# Patient Record
Sex: FEMALE | Race: WHITE | Employment: FULL TIME | URBAN - METROPOLITAN AREA
[De-identification: names, ages, dates, MRNs, and addresses within clinical notes are randomized per-mention and may not be internally consistent; named-entity substitution may affect disease eponyms.]

---

## 2017-04-14 ENCOUNTER — OFFICE VISIT (OUTPATIENT)
Dept: FAMILY MEDICINE CLINIC | Facility: CLINIC | Age: 27
End: 2017-04-14

## 2017-04-14 VITALS
BODY MASS INDEX: 44.73 KG/M2 | HEART RATE: 90 BPM | HEIGHT: 64 IN | OXYGEN SATURATION: 98 % | DIASTOLIC BLOOD PRESSURE: 70 MMHG | SYSTOLIC BLOOD PRESSURE: 110 MMHG | TEMPERATURE: 98 F | WEIGHT: 262 LBS

## 2017-04-14 DIAGNOSIS — I10 ESSENTIAL HYPERTENSION: Primary | Chronic | ICD-10-CM

## 2017-04-14 DIAGNOSIS — F41.9 ANXIETY: Chronic | ICD-10-CM

## 2017-04-14 DIAGNOSIS — J03.90 TONSILLITIS: ICD-10-CM

## 2017-04-14 DIAGNOSIS — R63.5 WEIGHT GAIN: ICD-10-CM

## 2017-04-14 DIAGNOSIS — K21.9 GASTROESOPHAGEAL REFLUX DISEASE WITHOUT ESOPHAGITIS: ICD-10-CM

## 2017-04-14 PROCEDURE — 99214 OFFICE O/P EST MOD 30 MIN: CPT | Performed by: FAMILY MEDICINE

## 2017-04-14 RX ORDER — ESCITALOPRAM OXALATE 20 MG/1
20 TABLET ORAL 2 TIMES DAILY
Qty: 180 TABLET | Refills: 0 | Status: SHIPPED | OUTPATIENT
Start: 2017-04-14 | End: 2017-07-31

## 2017-04-14 RX ORDER — BUPROPION HYDROCHLORIDE 150 MG/1
150 TABLET ORAL DAILY
Qty: 90 TABLET | Refills: 0 | Status: SHIPPED | OUTPATIENT
Start: 2017-04-14 | End: 2017-08-11 | Stop reason: ALTCHOICE

## 2017-04-14 RX ORDER — TRIAMTERENE AND HYDROCHLOROTHIAZIDE 37.5; 25 MG/1; MG/1
1 CAPSULE ORAL EVERY MORNING
Qty: 90 CAPSULE | Refills: 0 | Status: SHIPPED | OUTPATIENT
Start: 2017-04-14 | End: 2017-07-31

## 2017-04-14 RX ORDER — ESCITALOPRAM OXALATE 20 MG/1
20 TABLET ORAL DAILY
Qty: 90 TABLET | Refills: 0 | Status: SHIPPED | OUTPATIENT
Start: 2017-04-14 | End: 2017-04-14

## 2017-04-14 RX ORDER — PHENTERMINE HYDROCHLORIDE 37.5 MG/1
37.5 TABLET ORAL
Qty: 90 TABLET | Refills: 0 | Status: SHIPPED | OUTPATIENT
Start: 2017-04-14 | End: 2017-08-11 | Stop reason: ALTCHOICE

## 2017-04-14 RX ORDER — LANSOPRAZOLE 30 MG/1
30 CAPSULE, DELAYED RELEASE ORAL
Qty: 90 CAPSULE | Refills: 0 | Status: SHIPPED | OUTPATIENT
Start: 2017-04-14 | End: 2017-08-11 | Stop reason: ALTCHOICE

## 2017-04-14 RX ORDER — AZITHROMYCIN 250 MG/1
TABLET, FILM COATED ORAL
Qty: 6 TABLET | Refills: 0 | Status: SHIPPED | OUTPATIENT
Start: 2017-04-14 | End: 2017-08-11 | Stop reason: ALTCHOICE

## 2017-04-17 NOTE — PROGRESS NOTES
HPI:   Bhupinder Willoughby is a 28year old female. Patient presents with:  Sore Throat  Headache  Neck Pain  Hypertension  Anxiety  Weight Problem      She primarily presents for:  Anxiety and depression. Mood is better.   She complains of:  depression, 37.5-25 MG Oral Cap Take 1 capsule by mouth every morning. Disp: 90 capsule Rfl: 0   BuPROPion HCl ER, XL, (WELLBUTRIN XL) 150 MG Oral Tablet 24 Hr Take 1 tablet (150 mg total) by mouth daily.  Disp: 90 tablet Rfl: 0   azithromycin 250 MG Oral Tab Take two x 3, CN II-XII intact B, motor and sensory grossly intact B UE and LE, nl gait  MS: no joint deformity, FROM B UE/LE  PSYCH:  mood and affect WNL, speech and thought congruent, no SHIP    ASSESSMENT AND PLAN:   (I10) Essential hypertension  (primary encoun · Follow up: as above    Meds & Refills for this Visit:    Signed Prescriptions Disp Refills    lansoprazole 30 MG Oral Capsule Delayed Release 90 capsule 0      Sig: Take 1 capsule (30 mg total) by mouth every morning before breakfast.      Phentermine

## 2017-08-01 RX ORDER — TRIAMTERENE AND HYDROCHLOROTHIAZIDE 37.5; 25 MG/1; MG/1
CAPSULE ORAL
Qty: 90 CAPSULE | Refills: 0 | Status: SHIPPED | OUTPATIENT
Start: 2017-08-01 | End: 2017-11-04

## 2017-08-01 RX ORDER — ESCITALOPRAM OXALATE 20 MG/1
TABLET ORAL
Qty: 180 TABLET | Refills: 0 | Status: SHIPPED | OUTPATIENT
Start: 2017-08-01 | End: 2017-11-04

## 2017-08-02 ENCOUNTER — TELEPHONE (OUTPATIENT)
Dept: FAMILY MEDICINE CLINIC | Facility: CLINIC | Age: 27
End: 2017-08-02

## 2017-08-02 NOTE — TELEPHONE ENCOUNTER
Incoming fax from [facility], from surgeon Dr. Farooq Hernandez with operative report

## 2017-08-11 PROBLEM — E66.01 MORBID OBESITY DUE TO EXCESS CALORIES (HCC): Status: ACTIVE | Noted: 2017-08-11

## 2017-08-11 NOTE — PROGRESS NOTES
HPI:   Patient presents with:  HTN  Anxiety  Weight Loss Gain (metabolic)  Back Pain      Radha Stoner is a 28year old female who presents for anxiety and depression    Mood is better.   She complains of:   anxious, excessive worry, both significantl Oral Tab TAKE 1 TABLET BY MOUTH TWICE DAILY Disp: 180 tablet Rfl: 0   TRIAMTERENE-HCTZ 37.5-25 MG Oral Cap TAKE ONE CAPSULE BY MOUTH EVERY MORNING Disp: 90 capsule Rfl: 0      Past Medical History:   Diagnosis Date   • Anxiety    • Essential hypertension [facility]    ASSESSMENT AND PLAN:   1, Patient is a 28year old female who is here for anxiety and depression, denies SHIP     Pt has a history of treated anxiety and depression  Mood is better.   She is doing  well on the current treatment regimen-Lexapro 20 mg before breakfast.           Imaging & Consults:  None    Aguila Joe MD

## 2017-11-07 RX ORDER — ESCITALOPRAM OXALATE 20 MG/1
TABLET ORAL
Qty: 180 TABLET | Refills: 0 | Status: SHIPPED | OUTPATIENT
Start: 2017-11-07 | End: 2018-01-02

## 2017-11-07 RX ORDER — TRIAMTERENE AND HYDROCHLOROTHIAZIDE 37.5; 25 MG/1; MG/1
CAPSULE ORAL
Qty: 90 CAPSULE | Refills: 0 | Status: SHIPPED | OUTPATIENT
Start: 2017-11-07 | End: 2018-01-02

## 2017-11-13 DIAGNOSIS — E66.01 MORBID OBESITY DUE TO EXCESS CALORIES (HCC): ICD-10-CM

## 2017-11-14 RX ORDER — PHENTERMINE HYDROCHLORIDE 37.5 MG/1
TABLET ORAL
Qty: 90 TABLET | Refills: 0 | Status: SHIPPED
Start: 2017-11-14 | End: 2018-01-02

## 2018-01-02 ENCOUNTER — OFFICE VISIT (OUTPATIENT)
Dept: FAMILY MEDICINE CLINIC | Facility: CLINIC | Age: 28
End: 2018-01-02

## 2018-01-02 VITALS
WEIGHT: 234 LBS | TEMPERATURE: 98 F | HEIGHT: 64 IN | BODY MASS INDEX: 39.95 KG/M2 | HEART RATE: 99 BPM | OXYGEN SATURATION: 98 % | DIASTOLIC BLOOD PRESSURE: 88 MMHG | SYSTOLIC BLOOD PRESSURE: 140 MMHG

## 2018-01-02 DIAGNOSIS — H65.91 OME (OTITIS MEDIA WITH EFFUSION), RIGHT: ICD-10-CM

## 2018-01-02 DIAGNOSIS — H69.81 ETD (EUSTACHIAN TUBE DYSFUNCTION), RIGHT: ICD-10-CM

## 2018-01-02 DIAGNOSIS — F41.9 ANXIETY: Chronic | ICD-10-CM

## 2018-01-02 DIAGNOSIS — R63.5 WEIGHT GAIN: ICD-10-CM

## 2018-01-02 DIAGNOSIS — E66.01 MORBID OBESITY DUE TO EXCESS CALORIES (HCC): ICD-10-CM

## 2018-01-02 DIAGNOSIS — I10 ESSENTIAL HYPERTENSION: Primary | Chronic | ICD-10-CM

## 2018-01-02 DIAGNOSIS — J03.90 TONSILLITIS: ICD-10-CM

## 2018-01-02 PROCEDURE — 99214 OFFICE O/P EST MOD 30 MIN: CPT | Performed by: FAMILY MEDICINE

## 2018-01-02 RX ORDER — ACETAMINOPHEN AND CODEINE PHOSPHATE 300; 30 MG/1; MG/1
TABLET ORAL
Qty: 20 TABLET | Refills: 0 | Status: SHIPPED | OUTPATIENT
Start: 2018-01-02 | End: 2019-03-05 | Stop reason: ALTCHOICE

## 2018-01-02 RX ORDER — ESCITALOPRAM OXALATE 20 MG/1
20 TABLET ORAL 2 TIMES DAILY
Qty: 60 TABLET | Refills: 4 | Status: SHIPPED | OUTPATIENT
Start: 2018-01-02 | End: 2018-06-24

## 2018-01-02 RX ORDER — FLUTICASONE PROPIONATE 50 MCG
2 SPRAY, SUSPENSION (ML) NASAL DAILY
Qty: 1 BOTTLE | Refills: 2 | Status: SHIPPED | OUTPATIENT
Start: 2018-01-02 | End: 2018-04-24 | Stop reason: ALTCHOICE

## 2018-01-02 RX ORDER — NORETHINDRONE ACETATE AND ETHINYL ESTRADIOL 1; .02 MG/1; MG/1
1 TABLET ORAL DAILY
Qty: 3 PACKAGE | Refills: 2 | Status: SHIPPED | OUTPATIENT
Start: 2018-01-02 | End: 2018-04-24 | Stop reason: ALTCHOICE

## 2018-01-02 RX ORDER — PHENTERMINE HYDROCHLORIDE 37.5 MG/1
TABLET ORAL
Qty: 30 TABLET | Refills: 2 | Status: SHIPPED | OUTPATIENT
Start: 2018-01-02 | End: 2018-05-22

## 2018-01-02 RX ORDER — TRIAMTERENE AND HYDROCHLOROTHIAZIDE 37.5; 25 MG/1; MG/1
CAPSULE ORAL
Qty: 30 CAPSULE | Refills: 4 | Status: SHIPPED | OUTPATIENT
Start: 2018-01-02 | End: 2018-06-24

## 2018-01-02 NOTE — PROGRESS NOTES
HPI:   Patient presents with:  HTN  Depression  Medication Request  Anxiety  Tonsillitis  Weight Loss Gain (metabolic)      Patient Kristi Smith is a 28year old female who presents for recheck of her hypertension.     Home Blood Pressure monitoring w below.     Labs:   No results found for: A1C   No results found for: CHOLEST, HDL, TRIG, LDL, NONHDLC    No results found for: GLU, NA, K, CL, CO2, CREATSERUM, CA, ALB, TP, ALKPHO, AST, ALT, BILT, TSH, T4F        Medications: See recently prescribed medicat skin color overall wnl  HEENT: atraumatic, normocephalic, nose clear; throat: erythema, tonsils 3-4+, no exudate; dentition good, EARS: canals clear, TM: L dull, flat, R dull, bilging, effusion, mild erythema  EYES: PERRLA, EOMI, conjunctiva clear, no disc Lexapro 40 mg once a day, f/u 4-6 mo or sooner as needed  We discussed the risks and benefits of her medication including present dose of Lexapro.  The patient is aware of the risks of taking the above medication and indicates understanding of these risks a Oral Tab; 1-2 tabs po q 6 hours prn pain  Dispense: 20 tablet; Refill: 0  - Fluticasone Propionate 50 MCG/ACT Nasal Suspension; 2 sprays by Each Nare route daily. X 2 weeks then prn  Dispense: 1 Bottle;  Refill: 2      Meds & Refills for this Visit:    Sign

## 2018-01-09 ENCOUNTER — TELEPHONE (OUTPATIENT)
Dept: FAMILY MEDICINE CLINIC | Facility: CLINIC | Age: 28
End: 2018-01-09

## 2018-01-09 NOTE — TELEPHONE ENCOUNTER
Pt called said she sent a my chart message to dr coffman and would like a response from her asap. Infrmd her I would send message to dr coffman and clinical staff.

## 2018-01-17 ENCOUNTER — TELEPHONE (OUTPATIENT)
Dept: FAMILY MEDICINE CLINIC | Facility: CLINIC | Age: 28
End: 2018-01-17

## 2018-01-17 ENCOUNTER — PATIENT MESSAGE (OUTPATIENT)
Dept: FAMILY MEDICINE CLINIC | Facility: CLINIC | Age: 28
End: 2018-01-17

## 2018-04-24 ENCOUNTER — OFFICE VISIT (OUTPATIENT)
Dept: FAMILY MEDICINE CLINIC | Facility: CLINIC | Age: 28
End: 2018-04-24

## 2018-04-24 VITALS
TEMPERATURE: 99 F | DIASTOLIC BLOOD PRESSURE: 70 MMHG | HEIGHT: 64 IN | SYSTOLIC BLOOD PRESSURE: 124 MMHG | HEART RATE: 88 BPM | WEIGHT: 229 LBS | OXYGEN SATURATION: 98 % | BODY MASS INDEX: 39.09 KG/M2

## 2018-04-24 DIAGNOSIS — R10.9 FLANK PAIN, ACUTE: ICD-10-CM

## 2018-04-24 DIAGNOSIS — N39.0 RECURRENT UTI: ICD-10-CM

## 2018-04-24 DIAGNOSIS — N93.9 ABNORMAL VAGINAL BLEEDING: ICD-10-CM

## 2018-04-24 DIAGNOSIS — N10 ACUTE PYELONEPHRITIS: Primary | ICD-10-CM

## 2018-04-24 PROCEDURE — 87086 URINE CULTURE/COLONY COUNT: CPT | Performed by: FAMILY MEDICINE

## 2018-04-24 PROCEDURE — 99214 OFFICE O/P EST MOD 30 MIN: CPT | Performed by: FAMILY MEDICINE

## 2018-04-24 PROCEDURE — 81003 URINALYSIS AUTO W/O SCOPE: CPT | Performed by: FAMILY MEDICINE

## 2018-04-24 RX ORDER — HYDROCODONE BITARTRATE AND ACETAMINOPHEN 5; 325 MG/1; MG/1
1 TABLET ORAL EVERY 8 HOURS PRN
Qty: 20 TABLET | Refills: 0 | Status: SHIPPED | OUTPATIENT
Start: 2018-04-24 | End: 2019-03-05 | Stop reason: ALTCHOICE

## 2018-04-24 RX ORDER — CIPROFLOXACIN 500 MG/1
500 TABLET, FILM COATED ORAL 2 TIMES DAILY
Qty: 28 TABLET | Refills: 0 | Status: SHIPPED | OUTPATIENT
Start: 2018-04-24 | End: 2018-04-25 | Stop reason: ALTCHOICE

## 2018-04-25 ENCOUNTER — TELEPHONE (OUTPATIENT)
Dept: FAMILY MEDICINE CLINIC | Facility: CLINIC | Age: 28
End: 2018-04-25

## 2018-04-25 NOTE — TELEPHONE ENCOUNTER
Pt still has bad lower back pain and still some bleeding. in addition lymph nodes got swollen and painfull. Plz call to advise asap.

## 2018-04-25 NOTE — TELEPHONE ENCOUNTER
Addressed in another encounter. Pt instructed to seek medical care from nearest ED; pt verbalizes understanding.

## 2018-04-25 NOTE — PROGRESS NOTES
HPI:   Patient presents with:  Low Back Pain: for 4 days ibuprofen not helping   Vaginal Bleeding: fro 1 day  Urinary: bood in urine no pain or odor       Wallace Jennings is a 33year old female who presents with symptoms of UTI and right flank pain History reviewed. No pertinent surgical history. Amoxicillin                 REVIEW OF SYSTEMS:   Pertinent positives and negatives noted in the the HPI.  Specifically:  GEN:  No fever  HEAD:  No headaches, no dizziness  EYES:  No vision change or co was instructed she uses sparingly and to not eliminate pain but decrease severity  · Call/return if sx do not improve in next few day or sooner if worsen, consider renal/abdominal and pelvic ultrasound then  · Follow as above and pending results and in the tablet by mouth every 8 (eight) hours as needed for Pain.            Imaging & Consults:  US KIDNEY/BLADDER ([id number])    Volodymyr Zamora MD

## 2018-04-25 NOTE — TELEPHONE ENCOUNTER
Pt cld lymph nodes on right side of neck swollen,pain  When pressing and lumpy. Please cb as soon as possible she is concerned.

## 2018-04-25 NOTE — TELEPHONE ENCOUNTER
Spoke to pt, she states the swelling has worsened; pt instructed to seek medical care through the ED, call office to schedule f/u when d/c'd. Pt verbalizes understanding.

## 2018-04-26 RX ORDER — LEVOFLOXACIN 750 MG/1
750 TABLET ORAL DAILY
Qty: 20 TABLET | Refills: 0 | Status: SHIPPED | OUTPATIENT
Start: 2018-04-26 | End: 2018-04-26

## 2018-04-26 RX ORDER — LEVOFLOXACIN 750 MG/1
750 TABLET ORAL DAILY
Qty: 20 TABLET | Refills: 0 | Status: SHIPPED | OUTPATIENT
Start: 2018-04-26 | End: 2019-03-05 | Stop reason: ALTCHOICE

## 2018-04-26 NOTE — TELEPHONE ENCOUNTER
Pt returned call and rescheduled for Sat 4/27/18. Pharmacy called stated the quantity of Levofloxacin did not match sig. Qty decreased to 10; if pt to continue for 10 additional days, she will need a new prescription.

## 2018-04-26 NOTE — TELEPHONE ENCOUNTER
Pt went to er yesterday. they said she had salivary stones. massage and fu with pcp.pt still having back pain have period now and she shouldn't have. started taking a lot of (3000 mg)  vit c last week. Would like a fu call from nurse today. I offered to make ap

## 2018-04-26 NOTE — TELEPHONE ENCOUNTER
Pt returned call, states she did go to ER for evaluation. States she was told she has salivary stones; was instructed to massage area. Pt states the ED provider did not address the kidney issue even though the pt informed him.  Pt states she was told \"your

## 2018-04-27 ENCOUNTER — OFFICE VISIT (OUTPATIENT)
Dept: FAMILY MEDICINE CLINIC | Facility: CLINIC | Age: 28
End: 2018-04-27

## 2018-04-27 ENCOUNTER — HOSPITAL ENCOUNTER (EMERGENCY)
Facility: HOSPITAL | Age: 28
Discharge: HOME | End: 2018-04-27
Attending: PHYSICIAN ASSISTANT
Payer: COMMERCIAL

## 2018-04-27 ENCOUNTER — APPOINTMENT (OUTPATIENT)
Dept: CT IMAGING | Facility: HOSPITAL | Age: 28
End: 2018-04-27
Attending: PHYSICIAN ASSISTANT
Payer: COMMERCIAL

## 2018-04-27 VITALS
DIASTOLIC BLOOD PRESSURE: 89 MMHG | BODY MASS INDEX: 39.27 KG/M2 | TEMPERATURE: 98 F | WEIGHT: 230 LBS | OXYGEN SATURATION: 100 % | HEART RATE: 78 BPM | SYSTOLIC BLOOD PRESSURE: 136 MMHG | RESPIRATION RATE: 18 BRPM | HEIGHT: 64 IN

## 2018-04-27 VITALS
WEIGHT: 229 LBS | TEMPERATURE: 99 F | BODY MASS INDEX: 39.09 KG/M2 | HEART RATE: 96 BPM | SYSTOLIC BLOOD PRESSURE: 138 MMHG | DIASTOLIC BLOOD PRESSURE: 86 MMHG | HEIGHT: 64 IN | OXYGEN SATURATION: 99 %

## 2018-04-27 DIAGNOSIS — L03.211 CELLULITIS, FACE: ICD-10-CM

## 2018-04-27 DIAGNOSIS — J02.9 ACUTE PHARYNGITIS, UNSPECIFIED ETIOLOGY: ICD-10-CM

## 2018-04-27 DIAGNOSIS — R59.0 LYMPHADENOPATHY, CERVICAL: Primary | ICD-10-CM

## 2018-04-27 DIAGNOSIS — R51.9 HEADACHE, UNSPECIFIED HEADACHE TYPE: ICD-10-CM

## 2018-04-27 DIAGNOSIS — N39.0 RECURRENT UTI: ICD-10-CM

## 2018-04-27 DIAGNOSIS — R52 BODY ACHES: ICD-10-CM

## 2018-04-27 DIAGNOSIS — R10.9 FLANK PAIN, ACUTE: ICD-10-CM

## 2018-04-27 DIAGNOSIS — R59.0 CERVICAL LYMPHADENOPATHY: Primary | ICD-10-CM

## 2018-04-27 DIAGNOSIS — L02.01 ABSCESS OF FACE: ICD-10-CM

## 2018-04-27 PROCEDURE — 99284 EMERGENCY DEPT VISIT MOD MDM: CPT

## 2018-04-27 PROCEDURE — 96360 HYDRATION IV INFUSION INIT: CPT

## 2018-04-27 PROCEDURE — 96361 HYDRATE IV INFUSION ADD-ON: CPT

## 2018-04-27 PROCEDURE — 70491 CT SOFT TISSUE NECK W/DYE: CPT | Performed by: PHYSICIAN ASSISTANT

## 2018-04-27 PROCEDURE — 80048 BASIC METABOLIC PNL TOTAL CA: CPT | Performed by: PHYSICIAN ASSISTANT

## 2018-04-27 PROCEDURE — 99214 OFFICE O/P EST MOD 30 MIN: CPT | Performed by: FAMILY MEDICINE

## 2018-04-27 PROCEDURE — 85025 COMPLETE CBC W/AUTO DIFF WBC: CPT | Performed by: PHYSICIAN ASSISTANT

## 2018-04-27 RX ORDER — NORETHINDRONE ACETATE AND ETHINYL ESTRADIOL AND FERROUS FUMARATE 1MG-20(24)
KIT ORAL
COMMUNITY
End: 2018-09-27

## 2018-04-27 RX ORDER — VALACYCLOVIR HYDROCHLORIDE 1 G/1
TABLET, FILM COATED ORAL
Qty: 30 TABLET | Refills: 1 | Status: SHIPPED | OUTPATIENT
Start: 2018-04-27 | End: 2018-07-17 | Stop reason: ALTCHOICE

## 2018-04-27 RX ORDER — METHYLPREDNISOLONE 4 MG/1
TABLET ORAL
Qty: 1 KIT | Refills: 0 | Status: SHIPPED | OUTPATIENT
Start: 2018-04-27 | End: 2018-07-17 | Stop reason: ALTCHOICE

## 2018-04-27 NOTE — ED NOTES
Care assumed from triage. Patient presents from PCP office with c/o R mandibular swelling. Pt states she believes it may be an abscess. Pt alert and interacting appropriately, speaking in full sentences, respirations even and unlabored, no distress noted.

## 2018-04-27 NOTE — ED INITIAL ASSESSMENT (HPI)
Pt came in from PCP office for rule out peritonsillar abscess. Started on levoquin. Afebrile, swelling noted to right neck, airway intact. RR even and nonlabored, speaking in full sentences, ambulatory with steady gait.

## 2018-04-28 NOTE — ED PROVIDER NOTES
Patient Seen in: [facility] AND [facility] Emergency Department    History   Patient presents with:  Swelling Edema (cardiovascular, metabolic)    Stated Complaint: Right sided neck swelling    HPI    71-year-old female presents with chief complaint of right [place] MORNING   Acetaminophen-Codeine #3 300-30 MG Oral Tab,  1-2 tabs po q 6 hours prn pain       No family history on file.     Smoking status: Never Smoker                                                              Smokeless tobacco: Never Used organomegaly is noted. No guarding or peritoneal signs. Genitourinary: Not examined. Lymphatic: No gross lymphadenopathy noted, except as documented above. Musculoskeletal: Musculoskeletal system is grossly intact. There is no obvious deformity.   Neuro Ointment  Apply 1 each topically 2 (two) times daily. , Print Script, Disp-14 each, R-0

## 2018-04-30 NOTE — PROGRESS NOTES
HPI:  Patient presents with:  ER F/U: Salivary gland stones   Headache  URI  Sore Throat  Fatigue  Derm Problem      Eliana Pop is a 33year old female who presents for: Right neck and face swelling ×2-3 days    Patient also with complaints of headac at this time, no pelvic or abdominal pain, no nausea, no vomiting, no diarrhea    See ROS below      Amoxicillin                 Current Outpatient Prescriptions:  Norethin Ace-Eth Estrad-FE (JUNEL FE 24) 1-20 MG-MCG(24) Oral Tab Take by mouth.  Disp:  Rfl: pain or palpitations  LUNG:   SOB, cough, no wheeze  GI:  No abdominal pain.   No N/V/D/C  :  No dysuria  MS:  No new joint pain or swelling B  NEURO:  Denies numbness or weakness  PSYCH:  No new mood concerns  SKIN: see hpi     EXAM:  /86   Pulse 9 day, we discussed additional treatment options including observation and starting Medrol Dosepak, Valtrex, and/or ER evaluation at this time, due to severity of symptoms patient elects to go to ER for evaluation, (I spoke to nurse at [facility] AND [facility] ER w Visit:    Signed Prescriptions Disp Refills    methylPREDNISolone (MEDROL) 4 MG Oral Tablet Therapy Pack 1 kit 0      Sig: As directed.       ValACYclovir HCl (VALTREX) 1 G Oral Tab 30 tablet 1      Sig: One tab po tid x 7-10 days           Imaging & Consul

## 2018-05-22 DIAGNOSIS — E66.01 MORBID OBESITY DUE TO EXCESS CALORIES (HCC): ICD-10-CM

## 2018-05-22 DIAGNOSIS — R63.5 WEIGHT GAIN: ICD-10-CM

## 2018-05-22 RX ORDER — PHENTERMINE HYDROCHLORIDE 37.5 MG/1
TABLET ORAL
Qty: 30 TABLET | Refills: 1 | Status: SHIPPED
Start: 2018-05-22 | End: 2018-07-30

## 2018-05-23 NOTE — TELEPHONE ENCOUNTER
Medication does not pass protocol - rx routed Dr. Charlene Freire  Patient has apt scheduled for 6/5/18 36 [facility]  LOV 4/27/18  Last refill 1/2/18 #30, 2 refills.

## 2018-06-24 DIAGNOSIS — F41.9 ANXIETY: Chronic | ICD-10-CM

## 2018-06-24 DIAGNOSIS — I10 ESSENTIAL HYPERTENSION: Chronic | ICD-10-CM

## 2018-06-25 RX ORDER — TRIAMTERENE AND HYDROCHLOROTHIAZIDE 37.5; 25 MG/1; MG/1
CAPSULE ORAL
Qty: 30 CAPSULE | Refills: 0 | Status: SHIPPED | OUTPATIENT
Start: 2018-06-25 | End: 2018-07-22

## 2018-06-25 RX ORDER — ESCITALOPRAM OXALATE 20 MG/1
TABLET ORAL
Qty: 60 TABLET | Refills: 0 | Status: SHIPPED | OUTPATIENT
Start: 2018-06-25 | End: 2018-07-22

## 2018-06-25 NOTE — TELEPHONE ENCOUNTER
A refill request was received for:    Pending Prescriptions Disp Refills    ESCITALOPRAM 20 MG Oral Tab [Pharmacy Med Name: ESCITALOPRAM 20MG TABLETS] 60 tablet 0      Sig: TAKE 1 TABLET BY MOUTH TWICE DAILY       Signed Prescriptions Disp Refills    TRIAM

## 2018-07-16 PROBLEM — F41.9 ANXIETY: Chronic | Status: RESOLVED | Noted: 2017-04-14 | Resolved: 2018-07-16

## 2018-07-16 PROBLEM — F41.1 GENERALIZED ANXIETY DISORDER: Status: ACTIVE | Noted: 2018-07-16

## 2018-07-17 ENCOUNTER — APPOINTMENT (OUTPATIENT)
Dept: LAB | Age: 28
End: 2018-07-17
Attending: FAMILY MEDICINE
Payer: COMMERCIAL

## 2018-07-17 ENCOUNTER — OFFICE VISIT (OUTPATIENT)
Dept: FAMILY MEDICINE CLINIC | Facility: CLINIC | Age: 28
End: 2018-07-17
Payer: COMMERCIAL

## 2018-07-17 VITALS
SYSTOLIC BLOOD PRESSURE: 118 MMHG | HEART RATE: 84 BPM | DIASTOLIC BLOOD PRESSURE: 78 MMHG | RESPIRATION RATE: 17 BRPM | HEIGHT: 63 IN | OXYGEN SATURATION: 98 % | BODY MASS INDEX: 40.93 KG/M2 | WEIGHT: 231 LBS

## 2018-07-17 DIAGNOSIS — L03.011 PARONYCHIA OF RIGHT INDEX FINGER: ICD-10-CM

## 2018-07-17 DIAGNOSIS — Z00.00 ROUTINE MEDICAL EXAM: ICD-10-CM

## 2018-07-17 DIAGNOSIS — N92.6 IRREGULAR MENSES: ICD-10-CM

## 2018-07-17 DIAGNOSIS — I10 ESSENTIAL HYPERTENSION: Chronic | ICD-10-CM

## 2018-07-17 DIAGNOSIS — F41.1 GENERALIZED ANXIETY DISORDER: ICD-10-CM

## 2018-07-17 DIAGNOSIS — Z00.00 ROUTINE MEDICAL EXAM: Primary | ICD-10-CM

## 2018-07-17 LAB
APPEARANCE: CLEAR
BILIRUBIN: NEGATIVE
CHOLEST SERPL-MCNC: 225 MG/DL (ref 110–200)
GLUCOSE (URINE DIPSTICK): NEGATIVE MG/DL
HDLC SERPL-MCNC: 46 MG/DL
KETONES (URINE DIPSTICK): NEGATIVE MG/DL
LDLC SERPL CALC-MCNC: 139 MG/DL (ref 0–99)
LEUKOCYTES: NEGATIVE
MULTISTIX LOT#: NORMAL NUMERIC
NITRITE, URINE: NEGATIVE
NONHDLC SERPL-MCNC: 179 MG/DL
OCCULT BLOOD: NEGATIVE
PH, URINE: 7 (ref 4.5–8)
PROTEIN (URINE DIPSTICK): NEGATIVE MG/DL
SPECIFIC GRAVITY: 1.01 (ref 1–1.03)
TRIGL SERPL-MCNC: 199 MG/DL (ref 1–149)
TSH SERPL-ACNC: 0.96 UIU/ML (ref 0.45–5.33)
URINE-COLOR: YELLOW
UROBILINOGEN,SEMI-QN: 0.2 MG/DL (ref 0–1.9)

## 2018-07-17 PROCEDURE — 81003 URINALYSIS AUTO W/O SCOPE: CPT | Performed by: FAMILY MEDICINE

## 2018-07-17 PROCEDURE — 90471 IMMUNIZATION ADMIN: CPT | Performed by: FAMILY MEDICINE

## 2018-07-17 PROCEDURE — 84443 ASSAY THYROID STIM HORMONE: CPT

## 2018-07-17 PROCEDURE — 36415 COLL VENOUS BLD VENIPUNCTURE: CPT

## 2018-07-17 PROCEDURE — 90715 TDAP VACCINE 7 YRS/> IM: CPT | Performed by: FAMILY MEDICINE

## 2018-07-17 PROCEDURE — 99395 PREV VISIT EST AGE 18-39: CPT | Performed by: FAMILY MEDICINE

## 2018-07-17 PROCEDURE — 80061 LIPID PANEL: CPT

## 2018-07-17 PROCEDURE — 86480 TB TEST CELL IMMUN MEASURE: CPT

## 2018-07-17 NOTE — H&P
HPI:   Patient presents with:  Physical: fasting  Complete Form      Augustin Jacobo is a 33year old female who presents for a complete physical exam without pap/gyne exam.     Patient has new complaints of:  · None except she smashed her right index f Past Medical History:   Diagnosis Date   • Anxiety    • Essential hypertension    • IUD contraception     removed   • Strep pharyngitis       Past Surgical History:  No date: HC  SECTION LEVEL I  No date: WRIST FRACTURE SURGERY      Comment: left in no apparent distress  SKIN: no rashes, no suspicious lesions, color wnl, right index finger distally skin adjacent to damaged nail mildly erythematous and swollen, no discharge, no tenderness  HEENT: atraumatic, normocephalic, nose and throat are clear; Future  - ASSAY, THYROID STIM HORMONE; Future  - LIPID PANEL; Future  - TETANUS, DIPHTHERIA TOXOIDS AND ACELLULAR PERTUSIS VACCINE (TDAP), >7 YEARS, IM USE  - URINALYSIS, AUTO, W/O SCOPE    2.  Generalized anxiety disorder  Relatively stable despite some si

## 2018-07-22 DIAGNOSIS — F41.9 ANXIETY: Chronic | ICD-10-CM

## 2018-07-22 DIAGNOSIS — I10 ESSENTIAL HYPERTENSION: Chronic | ICD-10-CM

## 2018-07-23 LAB
M TB IFN-G CD4+ BCKGRND COR BLD-ACNC: -0.05 IU/ML
M TB IFN-G CD4+ T-CELLS BLD-ACNC: 0.11 IU/ML
M TB TUBERC IFN-G BLD QL: NEGATIVE
M TB TUBERC IGNF/MITOGEN IGNF CONTROL: 5.56 IU/ML

## 2018-07-23 RX ORDER — TRIAMTERENE AND HYDROCHLOROTHIAZIDE 37.5; 25 MG/1; MG/1
CAPSULE ORAL
Qty: 30 CAPSULE | Refills: 0 | Status: SHIPPED | OUTPATIENT
Start: 2018-07-23 | End: 2018-08-26

## 2018-07-23 RX ORDER — ESCITALOPRAM OXALATE 20 MG/1
TABLET ORAL
Qty: 180 TABLET | Refills: 1 | Status: SHIPPED | OUTPATIENT
Start: 2018-07-23 | End: 2019-01-21

## 2018-07-23 NOTE — TELEPHONE ENCOUNTER
A refill request was received for:    Pending Prescriptions Disp Refills    ESCITALOPRAM 20 MG Oral Tab [Pharmacy Med Name: ESCITALOPRAM 20MG TABLETS] 60 tablet 0      Sig: TAKE 1 TABLET BY MOUTH TWICE DAILY       Signed Prescriptions Disp Refills    TRIAMTERENE-HCTZ 37.5-25 MG Oral Cap 30 capsule 0      Sig: TAKE ONE CAPSULE BY MOUTH EVERY MORNING        Authorizing Provider: Magui Valencia User: Dago Hernandez         Last refill date: 6/25/18  Qty: 61  Last office visit: 7/17/18 for px   When is follow up due: Return in about 6 months (around 1/17/2019) for ANXIETY, HTN. No future appointments.

## 2018-07-24 PROBLEM — E78.5 DYSLIPIDEMIA: Status: ACTIVE | Noted: 2018-07-24

## 2018-07-30 ENCOUNTER — TELEPHONE (OUTPATIENT)
Dept: FAMILY MEDICINE CLINIC | Facility: CLINIC | Age: 28
End: 2018-07-30

## 2018-07-30 DIAGNOSIS — E66.01 MORBID OBESITY DUE TO EXCESS CALORIES (HCC): ICD-10-CM

## 2018-07-30 DIAGNOSIS — R63.5 WEIGHT GAIN: ICD-10-CM

## 2018-07-30 RX ORDER — PHENTERMINE HYDROCHLORIDE 37.5 MG/1
37.5 TABLET ORAL
Qty: 30 TABLET | Refills: 0 | Status: SHIPPED
Start: 2018-07-30 | End: 2018-09-19

## 2018-07-30 NOTE — TELEPHONE ENCOUNTER
Incoming fax from [facility] with controlled substance refill request-    A refill request was received for:    Pending Prescriptions Disp Refills    Phentermine HCl 37.5 MG Oral Tab 30 tablet 1       Last refill date:  5/22/18  Qty: 30, 1 refill  Last offi

## 2018-08-26 DIAGNOSIS — I10 ESSENTIAL HYPERTENSION: Chronic | ICD-10-CM

## 2018-08-26 RX ORDER — TRIAMTERENE AND HYDROCHLOROTHIAZIDE 37.5; 25 MG/1; MG/1
CAPSULE ORAL
Qty: 30 CAPSULE | Refills: 0 | Status: SHIPPED | OUTPATIENT
Start: 2018-08-26 | End: 2018-09-22

## 2018-09-19 ENCOUNTER — OFFICE VISIT (OUTPATIENT)
Dept: FAMILY MEDICINE CLINIC | Facility: CLINIC | Age: 28
End: 2018-09-19
Payer: COMMERCIAL

## 2018-09-19 VITALS
HEART RATE: 82 BPM | BODY MASS INDEX: 42.7 KG/M2 | WEIGHT: 241 LBS | DIASTOLIC BLOOD PRESSURE: 80 MMHG | SYSTOLIC BLOOD PRESSURE: 132 MMHG | HEIGHT: 63 IN | OXYGEN SATURATION: 98 % | RESPIRATION RATE: 17 BRPM

## 2018-09-19 DIAGNOSIS — S06.0X0A CONCUSSION WITHOUT LOSS OF CONSCIOUSNESS, INITIAL ENCOUNTER: ICD-10-CM

## 2018-09-19 DIAGNOSIS — Z23 NEED FOR INFLUENZA VACCINATION: ICD-10-CM

## 2018-09-19 DIAGNOSIS — R21 RASH: ICD-10-CM
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
## 2018-09-19 DIAGNOSIS — M79.2 RADICULAR PAIN IN RIGHT ARM: ICD-10-CM

## 2018-09-19 DIAGNOSIS — S13.9XXA NECK SPRAIN, INITIAL ENCOUNTER: Primary | ICD-10-CM

## 2018-09-19 DIAGNOSIS — M54.2 NECK PAIN, ACUTE: ICD-10-CM

## 2018-09-19 DIAGNOSIS — E66.01 MORBID OBESITY DUE TO EXCESS CALORIES (HCC): ICD-10-CM

## 2018-09-19 PROCEDURE — 90686 IIV4 VACC NO PRSV 0.5 ML IM: CPT | Performed by: FAMILY MEDICINE

## 2018-09-19 PROCEDURE — 99214 OFFICE O/P EST MOD 30 MIN: CPT | Performed by: FAMILY MEDICINE

## 2018-09-19 PROCEDURE — 90471 IMMUNIZATION ADMIN: CPT | Performed by: FAMILY MEDICINE

## 2018-09-19 RX ORDER — PHENTERMINE HYDROCHLORIDE 37.5 MG/1
37.5 TABLET ORAL
Qty: 30 TABLET | Refills: 3 | Status: SHIPPED
Start: 2018-09-19 | End: 2019-01-21

## 2018-09-19 RX ORDER — PREDNISONE 20 MG/1
20 TABLET ORAL 2 TIMES DAILY
Qty: 10 TABLET | Refills: 0 | Status: SHIPPED | OUTPATIENT
Start: 2018-09-19 | End: 2019-03-05 | Stop reason: ALTCHOICE

## 2018-09-19 RX ORDER — DICLOFENAC SODIUM 75 MG/1
75 TABLET, DELAYED RELEASE ORAL 2 TIMES DAILY
Qty: 60 TABLET | Refills: 1 | Status: SHIPPED | OUTPATIENT
Start: 2018-09-19 | End: 2019-03-05 | Stop reason: ALTCHOICE
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
## 2018-09-22 DIAGNOSIS — I10 ESSENTIAL HYPERTENSION: Chronic | ICD-10-CM

## 2018-09-24 RX ORDER — TRIAMTERENE AND HYDROCHLOROTHIAZIDE 37.5; 25 MG/1; MG/1
CAPSULE ORAL
Qty: 30 CAPSULE | Refills: 0 | Status: SHIPPED | OUTPATIENT
Start: 2018-09-24 | End: 2018-10-21

## 2018-09-27 RX ORDER — NORETHINDRONE ACETATE AND ETHINYL ESTRADIOL AND FERROUS FUMARATE 1MG-20(24)
KIT ORAL
Qty: 84 TABLET | Refills: 2 | Status: SHIPPED | OUTPATIENT
Start: 2018-09-27 | End: 2019-06-01

## 2018-09-27 NOTE — TELEPHONE ENCOUNTER
A refill request was received for:  Requested Prescriptions     Pending Prescriptions Disp Refills   • JUNEL FE 24 1-20 MG-MCG(24) Oral Tab [Pharmacy Med Name: JUNEL FE 24 TABLETS 28S] 84 tablet 0     Sig: TAKE 1 TABLET BY MOUTH EVERY DAY     Last refill d

## 2018-10-21 DIAGNOSIS — I10 ESSENTIAL HYPERTENSION: Chronic | ICD-10-CM

## 2018-10-22 RX ORDER — TRIAMTERENE AND HYDROCHLOROTHIAZIDE 37.5; 25 MG/1; MG/1
CAPSULE ORAL
Qty: 30 CAPSULE | Refills: 0 | Status: SHIPPED | OUTPATIENT
Start: 2018-10-22 | End: 2018-11-25

## 2018-11-25 DIAGNOSIS — I10 ESSENTIAL HYPERTENSION: Chronic | ICD-10-CM

## 2018-11-25 RX ORDER — TRIAMTERENE AND HYDROCHLOROTHIAZIDE 37.5; 25 MG/1; MG/1
CAPSULE ORAL
Qty: 30 CAPSULE | Refills: 0 | Status: SHIPPED | OUTPATIENT
Start: 2018-11-25 | End: 2018-12-24

## 2018-12-24 DIAGNOSIS — I10 ESSENTIAL HYPERTENSION: Chronic | ICD-10-CM

## 2018-12-26 RX ORDER — TRIAMTERENE AND HYDROCHLOROTHIAZIDE 37.5; 25 MG/1; MG/1
CAPSULE ORAL
Qty: 30 CAPSULE | Refills: 0 | Status: SHIPPED | OUTPATIENT
Start: 2018-12-26 | End: 2019-01-21

## 2019-01-21 DIAGNOSIS — E66.01 MORBID OBESITY DUE TO EXCESS CALORIES (HCC): ICD-10-CM

## 2019-01-21 DIAGNOSIS — F41.9 ANXIETY: Chronic | ICD-10-CM

## 2019-01-21 DIAGNOSIS — I10 ESSENTIAL HYPERTENSION: Chronic | ICD-10-CM

## 2019-01-21 RX ORDER — ESCITALOPRAM OXALATE 20 MG/1
TABLET ORAL
Qty: 180 TABLET | Refills: 0 | Status: SHIPPED | OUTPATIENT
Start: 2019-01-21 | End: 2019-04-28

## 2019-01-21 RX ORDER — TRIAMTERENE AND HYDROCHLOROTHIAZIDE 37.5; 25 MG/1; MG/1
CAPSULE ORAL
Qty: 30 CAPSULE | Refills: 0 | Status: SHIPPED | OUTPATIENT
Start: 2019-01-21 | End: 2019-02-21

## 2019-01-21 NOTE — TELEPHONE ENCOUNTER
A refill request was received for:  Requested Prescriptions     Pending Prescriptions Disp Refills   • Phentermine HCl 37.5 MG Oral Tab 30 tablet 3     Sig: Take 1 tablet (37.5 mg total) by mouth every morning before breakfast.     Last refill date: 9/19/1

## 2019-01-21 NOTE — TELEPHONE ENCOUNTER
A refill request was received for:  Requested Prescriptions     Pending Prescriptions Disp Refills   • ESCITALOPRAM 20 MG Oral Tab [Pharmacy Med Name: ESCITALOPRAM 20MG TABLETS] 180 tablet 0     Sig: TAKE 1 TABLET BY MOUTH TWICE DAILY     Last refill date:

## 2019-01-22 RX ORDER — PHENTERMINE HYDROCHLORIDE 37.5 MG/1
37.5 TABLET ORAL
Qty: 30 TABLET | Refills: 0 | Status: SHIPPED
Start: 2019-01-22 | End: 2019-03-05

## 2019-02-21 DIAGNOSIS — I10 ESSENTIAL HYPERTENSION: Chronic | ICD-10-CM

## 2019-02-21 RX ORDER — TRIAMTERENE AND HYDROCHLOROTHIAZIDE 37.5; 25 MG/1; MG/1
CAPSULE ORAL
Qty: 30 CAPSULE | Refills: 0 | Status: SHIPPED | OUTPATIENT
Start: 2019-02-21 | End: 2019-04-03

## 2019-03-05 ENCOUNTER — OFFICE VISIT (OUTPATIENT)
Dept: FAMILY MEDICINE CLINIC | Facility: CLINIC | Age: 29
End: 2019-03-05
Payer: COMMERCIAL

## 2019-03-05 VITALS
HEIGHT: 63 IN | HEART RATE: 90 BPM | RESPIRATION RATE: 17 BRPM | BODY MASS INDEX: 43.77 KG/M2 | WEIGHT: 247 LBS | SYSTOLIC BLOOD PRESSURE: 118 MMHG | DIASTOLIC BLOOD PRESSURE: 82 MMHG | OXYGEN SATURATION: 98 %

## 2019-03-05 DIAGNOSIS — Z76.89 ENCOUNTER FOR WEIGHT MANAGEMENT: ICD-10-CM

## 2019-03-05 DIAGNOSIS — F41.1 GENERALIZED ANXIETY DISORDER: ICD-10-CM

## 2019-03-05 DIAGNOSIS — E66.01 MORBID OBESITY DUE TO EXCESS CALORIES (HCC): ICD-10-CM

## 2019-03-05 DIAGNOSIS — I10 ESSENTIAL HYPERTENSION: Primary | ICD-10-CM

## 2019-03-05 PROCEDURE — 99214 OFFICE O/P EST MOD 30 MIN: CPT | Performed by: FAMILY MEDICINE

## 2019-03-05 RX ORDER — BUPROPION HYDROCHLORIDE 150 MG/1
150 TABLET ORAL DAILY
Qty: 90 TABLET | Refills: 0 | Status: SHIPPED | OUTPATIENT
Start: 2019-03-05 | End: 2019-06-05

## 2019-03-05 RX ORDER — PHENTERMINE HYDROCHLORIDE 37.5 MG/1
37.5 TABLET ORAL
Qty: 30 TABLET | Refills: 2 | Status: SHIPPED
Start: 2019-03-05 | End: 2019-06-19

## 2019-03-06 NOTE — PROGRESS NOTES
HPI:   Patient presents with:  Weight Problem  HTN  Anxiety      Patient Andria Villalpando is a 26year old female who presents for recheck of her hypertension.     Home Blood Pressure monitoring within the normal range  Current medication:  See below  Nicole Lao JUNEL FE 24 1-20 MG-MCG(24) Oral Tab TAKE 1 TABLET BY MOUTH EVERY DAY Disp: 84 tablet Rfl: 2     No current facility-administered medications on file prior to visit.     Past Medical History:   Diagnosis Date   • Anxiety    • Essential hypertension    • I apparent distress  SKIN: no suspicious lesions, no signs of infection or vascular compromise, skin color overall wnl  HEENT: atraumatic, normocephalic, nose clear; throat clear; dentition good, EARS: canals clear, TM wnl B  EYES: PERRLA, EOMI, conjunctiva weight management  Discussed treatment options, continue phentermine 37.5 mg once a day, start Wellbutrin  mg once a day as above, follow-up to 2-3 months for recheck  - buPROPion HCl ER, XL, (WELLBUTRIN XL) 150 MG Oral Tablet 24 Hr;  Take 1 tablet (1

## 2019-04-03 DIAGNOSIS — I10 ESSENTIAL HYPERTENSION: Chronic | ICD-10-CM

## 2019-04-04 RX ORDER — TRIAMTERENE AND HYDROCHLOROTHIAZIDE 37.5; 25 MG/1; MG/1
CAPSULE ORAL
Qty: 30 CAPSULE | Refills: 0 | Status: SHIPPED | OUTPATIENT
Start: 2019-04-04 | End: 2019-05-06

## 2019-04-28 DIAGNOSIS — F41.9 ANXIETY: Chronic | ICD-10-CM

## 2019-04-29 RX ORDER — ESCITALOPRAM OXALATE 20 MG/1
TABLET ORAL
Qty: 180 TABLET | Refills: 0 | Status: SHIPPED | OUTPATIENT
Start: 2019-04-29 | End: 2019-07-09

## 2019-05-06 DIAGNOSIS — I10 ESSENTIAL HYPERTENSION: Chronic | ICD-10-CM

## 2019-05-06 RX ORDER — TRIAMTERENE AND HYDROCHLOROTHIAZIDE 37.5; 25 MG/1; MG/1
CAPSULE ORAL
Qty: 90 CAPSULE | Refills: 0 | Status: SHIPPED | OUTPATIENT
Start: 2019-05-06 | End: 2019-07-25

## 2019-05-06 NOTE — TELEPHONE ENCOUNTER
A refill request was received for:  Requested Prescriptions     Pending Prescriptions Disp Refills   • TRIAMTERENE-HCTZ 37.5-25 MG Oral Cap [Pharmacy Med Name: TRIAMTERENE 37.5MG/ HCTZ 25MG CAPS] 30 capsule 0     Sig: TAKE ONE CAPSULE BY MOUTH EVERY Ariana Chetan

## 2019-06-04 RX ORDER — NORETHINDRONE ACETATE AND ETHINYL ESTRADIOL AND FERROUS FUMARATE 1MG-20(24)
KIT ORAL
Qty: 84 TABLET | Refills: 0 | Status: SHIPPED | OUTPATIENT
Start: 2019-06-04 | End: 2019-07-03

## 2019-06-04 NOTE — TELEPHONE ENCOUNTER
Call pt-LHK I sent in RF(s) and remind that she is due for follow up with me:  Now for anxiety, weight  Thanks!

## 2019-06-05 DIAGNOSIS — Z76.89 ENCOUNTER FOR WEIGHT MANAGEMENT: ICD-10-CM

## 2019-06-05 DIAGNOSIS — F41.1 GENERALIZED ANXIETY DISORDER: ICD-10-CM

## 2019-06-05 RX ORDER — BUPROPION HYDROCHLORIDE 150 MG/1
TABLET ORAL
Qty: 90 TABLET | Refills: 0 | Status: SHIPPED | OUTPATIENT
Start: 2019-06-05 | End: 2019-07-03 | Stop reason: ALTCHOICE

## 2019-06-05 NOTE — TELEPHONE ENCOUNTER
A refill request was received for:  Requested Prescriptions     Pending Prescriptions Disp Refills   • BUPROPION HCL ER, XL, 150 MG Oral Tablet 24 Hr [Pharmacy Med Name: BUPROPION XL 150MG TABLETS (24 H)] 90 tablet 0     Sig: TAKE 1 TABLET(150 MG) BY MOUTH

## 2019-06-19 DIAGNOSIS — F41.1 GENERALIZED ANXIETY DISORDER: ICD-10-CM

## 2019-06-19 DIAGNOSIS — Z76.89 ENCOUNTER FOR WEIGHT MANAGEMENT: ICD-10-CM

## 2019-06-19 DIAGNOSIS — E66.01 MORBID OBESITY DUE TO EXCESS CALORIES (HCC): ICD-10-CM

## 2019-06-19 RX ORDER — PHENTERMINE HYDROCHLORIDE 37.5 MG/1
TABLET ORAL
Qty: 30 TABLET | Refills: 0 | Status: SHIPPED
Start: 2019-06-19 | End: 2019-08-28

## 2019-06-19 NOTE — TELEPHONE ENCOUNTER
A refill request was received for:  Requested Prescriptions     Pending Prescriptions Disp Refills   • PHENTERMINE HCL 37.5 MG Oral Tab [Pharmacy Med Name: PHENTERMINE 37.5MG TABLETS] 30 tablet 0     Sig: TAKE 1 TABLET BY MOUTH EVERY MORNING BEFORE BREAKFA

## 2019-07-03 ENCOUNTER — OFFICE VISIT (OUTPATIENT)
Dept: FAMILY MEDICINE CLINIC | Facility: CLINIC | Age: 29
End: 2019-07-03
Payer: COMMERCIAL

## 2019-07-03 VITALS
HEIGHT: 63 IN | RESPIRATION RATE: 17 BRPM | DIASTOLIC BLOOD PRESSURE: 82 MMHG | HEART RATE: 78 BPM | SYSTOLIC BLOOD PRESSURE: 136 MMHG | BODY MASS INDEX: 45.18 KG/M2 | WEIGHT: 255 LBS | OXYGEN SATURATION: 98 %

## 2019-07-03 DIAGNOSIS — Z00.00 ROUTINE MEDICAL EXAM: ICD-10-CM

## 2019-07-03 DIAGNOSIS — R53.83 OTHER FATIGUE: ICD-10-CM

## 2019-07-03 DIAGNOSIS — F41.1 GENERALIZED ANXIETY DISORDER: ICD-10-CM

## 2019-07-03 DIAGNOSIS — Z79.899 LONG-TERM USE OF HIGH-RISK MEDICATION: ICD-10-CM

## 2019-07-03 DIAGNOSIS — I10 ESSENTIAL HYPERTENSION: Chronic | ICD-10-CM

## 2019-07-03 DIAGNOSIS — E78.5 DYSLIPIDEMIA: ICD-10-CM

## 2019-07-03 DIAGNOSIS — E66.01 MORBID OBESITY DUE TO EXCESS CALORIES (HCC): Primary | ICD-10-CM

## 2019-07-03 PROCEDURE — 99214 OFFICE O/P EST MOD 30 MIN: CPT | Performed by: FAMILY MEDICINE

## 2019-07-03 RX ORDER — TOPIRAMATE 50 MG/1
50 TABLET, FILM COATED ORAL 2 TIMES DAILY
Qty: 60 TABLET | Refills: 1 | Status: SHIPPED | OUTPATIENT
Start: 2019-07-03 | End: 2019-11-04

## 2019-07-03 RX ORDER — NORETHINDRONE ACETATE AND ETHINYL ESTRADIOL 1MG-20(21)
1 KIT ORAL
Qty: 4 PACKAGE | Refills: 2 | Status: SHIPPED | OUTPATIENT
Start: 2019-07-03 | End: 2019-09-24

## 2019-07-03 NOTE — PROGRESS NOTES
HPI:   Patient presents with:  Weight Problem  Anxiety  Hypertension      Daniel Spears is a 26year old female. She primarily presents for:  Anxiety with depression  Mood is better. She complains of: feeling anxious, excessive worry.   She denies: (LOESTRIN FE 1/20) 1-20 MG-MCG Oral Tab Take 1 tablet by mouth once daily. TAKE CONTINUOUSLY X 84 DAYS THEN STOP X 1 WEEK THEN RESTART Disp: 4 Package Rfl: 2   topiramate (TOPAMAX) 50 MG Oral Tab Take 1 tablet (50 mg total) by mouth 2 (two) times daily.  Noman Farah Estimated body mass index is 45.17 kg/m² as calculated from the following:    Height as of this encounter: 63\". Weight as of this encounter: 255 lb.    Wt Readings from Last 3 Encounters:  07/03/19 : 255 lb  03/05/19 : 247 lb  09/19/18 : 241 lb    GENER risks and benefits of her medication. The patient aware of the risks of taking the above medication and indicates understanding of these risks and agrees to the above plan.     4. Generalized anxiety disorder  Appears stable, mood good, no SHIP, will CPM, f

## 2019-07-09 DIAGNOSIS — F41.9 ANXIETY: Chronic | ICD-10-CM

## 2019-07-09 RX ORDER — ESCITALOPRAM OXALATE 20 MG/1
TABLET ORAL
Qty: 180 TABLET | Refills: 0 | Status: SHIPPED | OUTPATIENT
Start: 2019-07-09 | End: 2019-09-21

## 2019-07-25 DIAGNOSIS — E66.01 MORBID OBESITY DUE TO EXCESS CALORIES (HCC): ICD-10-CM

## 2019-07-25 DIAGNOSIS — I10 ESSENTIAL HYPERTENSION: Chronic | ICD-10-CM

## 2019-07-25 DIAGNOSIS — Z76.89 ENCOUNTER FOR WEIGHT MANAGEMENT: ICD-10-CM

## 2019-07-25 DIAGNOSIS — F41.1 GENERALIZED ANXIETY DISORDER: ICD-10-CM

## 2019-07-25 RX ORDER — TRIAMTERENE AND HYDROCHLOROTHIAZIDE 37.5; 25 MG/1; MG/1
CAPSULE ORAL
Qty: 30 CAPSULE | Refills: 0 | Status: SHIPPED | OUTPATIENT
Start: 2019-07-25 | End: 2019-11-29

## 2019-07-25 NOTE — TELEPHONE ENCOUNTER
A refill request was received for:  Requested Prescriptions     Pending Prescriptions Disp Refills   • Triamterene-HCTZ 37.5-25 MG Oral Cap 90 capsule 0     Sig: TAKE ONE CAPSULE BY MOUTH EVERY MORNING     Last refill date: 5/6/2019  Qty: 90  Last office v

## 2019-07-26 RX ORDER — PHENTERMINE HYDROCHLORIDE 37.5 MG/1
TABLET ORAL
Qty: 30 TABLET | Refills: 0 | OUTPATIENT
Start: 2019-07-26

## 2019-07-26 NOTE — TELEPHONE ENCOUNTER
Called in rx per Dr. Carroll Buchanan -  Rx: Phentermine 37.5 mg tab  Sig: take 1 tab po q AM before breakfast  Qty: 30, 0 refills

## 2019-08-28 DIAGNOSIS — F41.1 GENERALIZED ANXIETY DISORDER: ICD-10-CM

## 2019-08-28 DIAGNOSIS — E66.01 MORBID OBESITY DUE TO EXCESS CALORIES (HCC): ICD-10-CM

## 2019-08-28 DIAGNOSIS — Z76.89 ENCOUNTER FOR WEIGHT MANAGEMENT: ICD-10-CM

## 2019-08-28 RX ORDER — PHENTERMINE HYDROCHLORIDE 37.5 MG/1
TABLET ORAL
Qty: 30 TABLET | Refills: 1 | Status: SHIPPED
Start: 2019-08-28 | End: 2019-11-05

## 2019-08-28 NOTE — TELEPHONE ENCOUNTER
A refill request was received for:  Requested Prescriptions     Pending Prescriptions Disp Refills   • Phentermine HCl 37.5 MG Oral Tab 30 tablet 0     Sig: TAKE 1 TABLET BY MOUTH EVERY MORNING BEFORE BREAKFAST     Last refill date:   6/19/2019  Qty: 30  L

## 2019-09-21 DIAGNOSIS — F41.9 ANXIETY: Chronic | ICD-10-CM

## 2019-09-24 RX ORDER — NORETHINDRONE ACETATE AND ETHINYL ESTRADIOL AND FERROUS FUMARATE 1MG-20(24)
KIT ORAL
Qty: 84 TABLET | Refills: 1 | Status: SHIPPED | OUTPATIENT
Start: 2019-09-24 | End: 2020-03-30

## 2019-09-24 RX ORDER — ESCITALOPRAM OXALATE 20 MG/1
TABLET ORAL
Qty: 180 TABLET | Refills: 0 | Status: SHIPPED | OUTPATIENT
Start: 2019-09-24 | End: 2020-01-30

## 2019-09-24 NOTE — TELEPHONE ENCOUNTER
Call pt-LHK I sent in RF(s) and remind that she is due for follow up with me:    Next month  I am booking out 4-6 weeks so make appointment now if able. Thanks!

## 2019-09-26 DIAGNOSIS — I10 ESSENTIAL HYPERTENSION: Chronic | ICD-10-CM

## 2019-09-27 RX ORDER — TRIAMTERENE AND HYDROCHLOROTHIAZIDE 37.5; 25 MG/1; MG/1
CAPSULE ORAL
Qty: 90 CAPSULE | Refills: 0 | Status: SHIPPED | OUTPATIENT
Start: 2019-09-27 | End: 2019-12-31

## 2019-09-27 NOTE — TELEPHONE ENCOUNTER
A refill request was received for:  Requested Prescriptions     Pending Prescriptions Disp Refills   • TRIAMTERENE-HCTZ 37.5-25 MG Oral Cap [Pharmacy Med Name: TRIAMTERENE 37.5MG/ HCTZ 25MG CAPS] 90 capsule 0     Sig: TAKE 1 CAPSULE BY MOUTH EVERY MORNING

## 2019-11-04 DIAGNOSIS — E66.01 MORBID OBESITY DUE TO EXCESS CALORIES (HCC): ICD-10-CM

## 2019-11-04 NOTE — TELEPHONE ENCOUNTER
A refill request was received for:  Requested Prescriptions     Pending Prescriptions Disp Refills   • TOPIRAMATE 50 MG Oral Tab [Pharmacy Med Name: TOPIRAMATE 50MG TABLETS] 60 tablet 0     Sig: TAKE 1 TABLET(50 MG) BY MOUTH TWICE DAILY     Last refill airam

## 2019-11-05 DIAGNOSIS — E66.01 MORBID OBESITY DUE TO EXCESS CALORIES (HCC): ICD-10-CM

## 2019-11-05 DIAGNOSIS — Z76.89 ENCOUNTER FOR WEIGHT MANAGEMENT: ICD-10-CM

## 2019-11-05 DIAGNOSIS — F41.1 GENERALIZED ANXIETY DISORDER: ICD-10-CM

## 2019-11-05 RX ORDER — PHENTERMINE HYDROCHLORIDE 37.5 MG/1
TABLET ORAL
Qty: 30 TABLET | Refills: 0 | Status: SHIPPED | OUTPATIENT
Start: 2019-11-05 | End: 2019-11-29

## 2019-11-05 RX ORDER — TOPIRAMATE 50 MG/1
TABLET, FILM COATED ORAL
Qty: 60 TABLET | Refills: 1 | Status: SHIPPED | OUTPATIENT
Start: 2019-11-05 | End: 2019-11-29 | Stop reason: ALTCHOICE

## 2019-11-05 NOTE — TELEPHONE ENCOUNTER
A refill request was received for:  Requested Prescriptions     Pending Prescriptions Disp Refills   • Phentermine HCl 37.5 MG Oral Tab 30 tablet 1     Sig: TAKE 1 TABLET BY MOUTH EVERY MORNING BEFORE BREAKFAST     Last refill date: 8/28/2019  Qty:    30,

## 2019-11-05 NOTE — TELEPHONE ENCOUNTER
Pt cld needs this med and phentermine. She has been out for a week. Her pharmacy said we denied the phentermine. Told her I would send to clinical for them to check with Dr Kate Treviño.

## 2019-11-29 ENCOUNTER — OFFICE VISIT (OUTPATIENT)
Dept: FAMILY MEDICINE CLINIC | Facility: CLINIC | Age: 29
End: 2019-11-29
Payer: COMMERCIAL

## 2019-11-29 VITALS
HEART RATE: 78 BPM | HEIGHT: 63 IN | DIASTOLIC BLOOD PRESSURE: 78 MMHG | OXYGEN SATURATION: 98 % | WEIGHT: 263 LBS | SYSTOLIC BLOOD PRESSURE: 118 MMHG | BODY MASS INDEX: 46.6 KG/M2 | RESPIRATION RATE: 17 BRPM

## 2019-11-29 DIAGNOSIS — E66.01 MORBID OBESITY DUE TO EXCESS CALORIES (HCC): ICD-10-CM

## 2019-11-29 DIAGNOSIS — Z23 NEEDS FLU SHOT: Primary | ICD-10-CM

## 2019-11-29 DIAGNOSIS — F41.1 GENERALIZED ANXIETY DISORDER: ICD-10-CM

## 2019-11-29 DIAGNOSIS — Z76.89 ENCOUNTER FOR WEIGHT MANAGEMENT: ICD-10-CM

## 2019-11-29 DIAGNOSIS — E78.5 DYSLIPIDEMIA: ICD-10-CM

## 2019-11-29 DIAGNOSIS — I10 ESSENTIAL HYPERTENSION: Chronic | ICD-10-CM

## 2019-11-29 PROCEDURE — 90686 IIV4 VACC NO PRSV 0.5 ML IM: CPT | Performed by: FAMILY MEDICINE

## 2019-11-29 PROCEDURE — 99214 OFFICE O/P EST MOD 30 MIN: CPT | Performed by: FAMILY MEDICINE

## 2019-11-29 PROCEDURE — 90471 IMMUNIZATION ADMIN: CPT | Performed by: FAMILY MEDICINE

## 2019-11-29 RX ORDER — PHENTERMINE HYDROCHLORIDE 37.5 MG/1
TABLET ORAL
Qty: 90 TABLET | Refills: 0 | Status: SHIPPED
Start: 2019-11-29 | End: 2019-11-29

## 2019-11-29 RX ORDER — PHENTERMINE HYDROCHLORIDE 37.5 MG/1
TABLET ORAL
Qty: 90 TABLET | Refills: 0 | Status: SHIPPED | OUTPATIENT
Start: 2019-11-29 | End: 2020-03-02

## 2019-11-29 NOTE — PROGRESS NOTES
HPI:   Patient presents with:  Weight Problem  HTN  Anxiety      Patient Carrie Huerta is a 26year old female who presents for recheck of her hypertension.     Home Blood Pressure monitoring within the normal range  Current medication:  See below  Tracy Chavira facility-administered medications on file prior to visit.       Past Medical History:   Diagnosis Date   • Anxiety    • Essential hypertension    • IUD contraception     removed   • Strep pharyngitis       Past Surgical History:   Procedure Laterality Date infection or vascular compromise, skin color overall wnl  HEENT: atraumatic, normocephalic, nose clear; throat clear; dentition good, EARS: canals clear, TM wnl B  EYES: PERRLA, EOMI, conjunctiva clear, no discharge B  NECK: supple, no LAD, no TM, no carot as above  - Phentermine HCl 37.5 MG Oral Tab; TAKE 1 TABLET BY MOUTH EVERY MORNING BEFORE BREAKFAST  Dispense: 90 tablet; Refill: 0    5.  Encounter for weight management  SA  - Phentermine HCl 37.5 MG Oral Tab; TAKE 1 TABLET BY MOUTH EVERY MORNING BEFORE B

## 2019-12-31 DIAGNOSIS — I10 ESSENTIAL HYPERTENSION: Chronic | ICD-10-CM

## 2019-12-31 RX ORDER — TRIAMTERENE AND HYDROCHLOROTHIAZIDE 37.5; 25 MG/1; MG/1
CAPSULE ORAL
Qty: 90 CAPSULE | Refills: 0 | Status: SHIPPED | OUTPATIENT
Start: 2019-12-31 | End: 2020-04-06

## 2020-01-08 DIAGNOSIS — E66.01 MORBID OBESITY DUE TO EXCESS CALORIES (HCC): ICD-10-CM

## 2020-01-09 RX ORDER — TOPIRAMATE 50 MG/1
TABLET, FILM COATED ORAL
Qty: 60 TABLET | Refills: 1 | OUTPATIENT
Start: 2020-01-09

## 2020-01-30 DIAGNOSIS — F41.9 ANXIETY: Chronic | ICD-10-CM

## 2020-01-30 RX ORDER — ESCITALOPRAM OXALATE 20 MG/1
TABLET ORAL
Qty: 180 TABLET | Refills: 0 | Status: SHIPPED | OUTPATIENT
Start: 2020-01-30 | End: 2020-04-29

## 2020-03-01 DIAGNOSIS — F41.1 GENERALIZED ANXIETY DISORDER: ICD-10-CM

## 2020-03-01 DIAGNOSIS — E66.01 MORBID OBESITY DUE TO EXCESS CALORIES (HCC): ICD-10-CM

## 2020-03-01 DIAGNOSIS — Z76.89 ENCOUNTER FOR WEIGHT MANAGEMENT: ICD-10-CM

## 2020-03-02 RX ORDER — PHENTERMINE HYDROCHLORIDE 37.5 MG/1
TABLET ORAL
Qty: 90 TABLET | Refills: 0 | Status: SHIPPED | OUTPATIENT
Start: 2020-03-02 | End: 2020-06-09

## 2020-03-02 NOTE — TELEPHONE ENCOUNTER
Patient due for f/u in May per Dr. Hank Wilson last note. Please schedule her and then we can refill this medication. Thanks!

## 2020-03-30 RX ORDER — NORETHINDRONE ACETATE AND ETHINYL ESTRADIOL AND FERROUS FUMARATE 1MG-20(24)
KIT ORAL
Qty: 84 TABLET | Refills: 1 | Status: SHIPPED | OUTPATIENT
Start: 2020-03-30 | End: 2020-09-23

## 2020-03-30 NOTE — TELEPHONE ENCOUNTER
A refill request was received for:  Requested Prescriptions     Pending Prescriptions Disp Refills   • JUNEL FE 24 1-20 MG-MCG(24) Oral Tab [Pharmacy Med Name: JUNEL FE 24 TABLETS 28S] 84 tablet 1     Sig: TAKE 1 TABLET BY MOUTH EVERY DAY     Last refill d

## 2020-04-06 DIAGNOSIS — I10 ESSENTIAL HYPERTENSION: Chronic | ICD-10-CM

## 2020-04-06 RX ORDER — TRIAMTERENE AND HYDROCHLOROTHIAZIDE 37.5; 25 MG/1; MG/1
CAPSULE ORAL
Qty: 90 CAPSULE | Refills: 0 | Status: SHIPPED | OUTPATIENT
Start: 2020-04-06 | End: 2020-06-30

## 2020-04-06 NOTE — TELEPHONE ENCOUNTER
A refill request was received for:  Requested Prescriptions     Pending Prescriptions Disp Refills   • Triamterene-HCTZ 37.5-25 MG Oral Cap 90 capsule 0     Sig: Take 1 capsule by mouth.      Last refill date: 12/31/2019  Qty:90  Last office visit: 11/29/20

## 2020-04-10 ENCOUNTER — TELEPHONE (OUTPATIENT)
Dept: FAMILY MEDICINE CLINIC | Facility: CLINIC | Age: 30
End: 2020-04-10

## 2020-04-10 ENCOUNTER — VIRTUAL PHONE E/M (OUTPATIENT)
Dept: FAMILY MEDICINE CLINIC | Facility: CLINIC | Age: 30
End: 2020-04-10
Payer: COMMERCIAL

## 2020-04-10 DIAGNOSIS — Z86.19 H/O VIRAL ILLNESS: ICD-10-CM

## 2020-04-10 DIAGNOSIS — I10 ESSENTIAL HYPERTENSION: Primary | ICD-10-CM

## 2020-04-10 DIAGNOSIS — E66.01 CLASS 3 SEVERE OBESITY DUE TO EXCESS CALORIES WITHOUT SERIOUS COMORBIDITY WITH BODY MASS INDEX (BMI) OF 45.0 TO 49.9 IN ADULT (HCC): ICD-10-CM

## 2020-04-10 DIAGNOSIS — Z76.89 ENCOUNTER FOR WEIGHT MANAGEMENT: ICD-10-CM

## 2020-04-10 DIAGNOSIS — Z79.899 LONG-TERM USE OF HIGH-RISK MEDICATION: ICD-10-CM

## 2020-04-10 DIAGNOSIS — Z00.00 ROUTINE MEDICAL EXAM: ICD-10-CM

## 2020-04-10 DIAGNOSIS — F41.1 GENERALIZED ANXIETY DISORDER: ICD-10-CM

## 2020-04-10 PROCEDURE — 99214 OFFICE O/P EST MOD 30 MIN: CPT | Performed by: FAMILY MEDICINE

## 2020-04-10 RX ORDER — ESCITALOPRAM OXALATE 20 MG/1
20 TABLET ORAL 2 TIMES DAILY
Qty: 180 TABLET | Refills: 0 | Status: CANCELLED | OUTPATIENT
Start: 2020-04-10

## 2020-04-10 NOTE — PROGRESS NOTES
Virtual Telephone Check-In    Krystal Velasquezs verbally consents to a Virtual/Telephone Check-In visit on 04/10/20. Patient understands and accepts financial responsibility for any deductible, co-insurance and/or co-pays associated with this service. viral upper respiratory tract infection, mid March which is now resolved, cough low-grade fevers, fatigue, family member also ill, patient believes may have been coded, she denies any respiratory distress shortness of breath or cough now, no fever, no chil history, patient to purchase blood pressure monitor and check blood pressure readings for 1 week then email me the results, will continue present management for now, follow-up with me in 3 months    2.  Generalized anxiety disorder  Appears stable, mood ove CMP      CBC      LIPID PANEL      TSH      Meds & Refills for this Visit:  Requested Prescriptions      No prescriptions requested or ordered in this encounter       None    Lauri Cunningham MD

## 2020-04-29 DIAGNOSIS — F41.9 ANXIETY: Chronic | ICD-10-CM

## 2020-04-29 RX ORDER — ESCITALOPRAM OXALATE 20 MG/1
TABLET ORAL
Qty: 180 TABLET | Refills: 0 | Status: SHIPPED | OUTPATIENT
Start: 2020-04-29 | End: 2020-07-30

## 2020-06-09 DIAGNOSIS — F41.1 GENERALIZED ANXIETY DISORDER: ICD-10-CM

## 2020-06-09 DIAGNOSIS — Z76.89 ENCOUNTER FOR WEIGHT MANAGEMENT: ICD-10-CM

## 2020-06-09 DIAGNOSIS — E66.01 MORBID OBESITY DUE TO EXCESS CALORIES (HCC): ICD-10-CM

## 2020-06-09 RX ORDER — PHENTERMINE HYDROCHLORIDE 37.5 MG/1
TABLET ORAL
Qty: 90 TABLET | Refills: 0 | Status: SHIPPED | OUTPATIENT
Start: 2020-06-09 | End: 2020-08-11

## 2020-06-09 NOTE — TELEPHONE ENCOUNTER
Call pt-LHK I sent in RFfor her phentermine and remind that she is due for follow up with me: In the next 2-3 months  I am booking out 4-6 weeks so make appointment now if able. Thanks!

## 2020-06-30 DIAGNOSIS — I10 ESSENTIAL HYPERTENSION: Chronic | ICD-10-CM

## 2020-06-30 RX ORDER — TRIAMTERENE AND HYDROCHLOROTHIAZIDE 37.5; 25 MG/1; MG/1
CAPSULE ORAL
Qty: 90 CAPSULE | Refills: 0 | Status: SHIPPED | OUTPATIENT
Start: 2020-06-30 | End: 2020-10-02

## 2020-07-08 NOTE — TELEPHONE ENCOUNTER
A refill request was received for:  Requested Prescriptions     Pending Prescriptions Disp Refills   • TRIAMTERENE-HCTZ 37.5-25 MG Oral Cap [Pharmacy Med Name: TRIAMTERENE 37.5MG/ HCTZ 25MG CAPS] 90 capsule 0     Sig: TAKE 1 CAPSULE BY MOUTH EVERY MORNING
Call pt-LHK I sent in RF(s) and remind that she is due for follow up with me: In the next 1-2 months  I am booking out 4-6 weeks so make appointment now if able. Thanks!
LVM for pt regarding rx and to schedule.
Pt scheduled via "[employer] for 8/11/20.
1500 cc LR

## 2020-07-30 DIAGNOSIS — F41.9 ANXIETY: Chronic | ICD-10-CM

## 2020-07-30 RX ORDER — ESCITALOPRAM OXALATE 20 MG/1
TABLET ORAL
Qty: 180 TABLET | Refills: 0 | Status: SHIPPED | OUTPATIENT
Start: 2020-07-30 | End: 2020-10-27

## 2020-08-11 ENCOUNTER — OFFICE VISIT (OUTPATIENT)
Dept: FAMILY MEDICINE CLINIC | Facility: CLINIC | Age: 30
End: 2020-08-11
Payer: COMMERCIAL

## 2020-08-11 VITALS
SYSTOLIC BLOOD PRESSURE: 134 MMHG | DIASTOLIC BLOOD PRESSURE: 82 MMHG | BODY MASS INDEX: 46 KG/M2 | WEIGHT: 262 LBS | HEART RATE: 80 BPM

## 2020-08-11 DIAGNOSIS — I10 ESSENTIAL HYPERTENSION: Primary | Chronic | ICD-10-CM

## 2020-08-11 DIAGNOSIS — F41.1 GENERALIZED ANXIETY DISORDER: ICD-10-CM

## 2020-08-11 DIAGNOSIS — K21.9 GASTROESOPHAGEAL REFLUX DISEASE WITHOUT ESOPHAGITIS: ICD-10-CM

## 2020-08-11 DIAGNOSIS — Z00.00 ROUTINE MEDICAL EXAM: ICD-10-CM

## 2020-08-11 DIAGNOSIS — E78.5 DYSLIPIDEMIA: ICD-10-CM

## 2020-08-11 DIAGNOSIS — Z76.89 ENCOUNTER FOR WEIGHT MANAGEMENT: ICD-10-CM

## 2020-08-11 DIAGNOSIS — E66.01 MORBID OBESITY DUE TO EXCESS CALORIES (HCC): ICD-10-CM

## 2020-08-11 DIAGNOSIS — Z79.899 LONG-TERM USE OF HIGH-RISK MEDICATION: ICD-10-CM

## 2020-08-11 PROCEDURE — 3075F SYST BP GE 130 - 139MM HG: CPT | Performed by: FAMILY MEDICINE

## 2020-08-11 PROCEDURE — 3079F DIAST BP 80-89 MM HG: CPT | Performed by: FAMILY MEDICINE

## 2020-08-11 PROCEDURE — 99214 OFFICE O/P EST MOD 30 MIN: CPT | Performed by: FAMILY MEDICINE

## 2020-08-11 RX ORDER — PHENTERMINE HYDROCHLORIDE 37.5 MG/1
37.5 TABLET ORAL
Qty: 90 TABLET | Refills: 0 | Status: SHIPPED | OUTPATIENT
Start: 2020-08-11 | End: 2020-12-23

## 2020-08-11 RX ORDER — PHENTERMINE HYDROCHLORIDE 37.5 MG/1
37.5 TABLET ORAL
Qty: 90 TABLET | Refills: 0 | Status: SHIPPED | OUTPATIENT
Start: 2020-08-11 | End: 2020-08-11

## 2020-08-11 NOTE — PROGRESS NOTES
HPI:   Patient presents with:  Hypertension  Anxiety  Depression  Weight Loss Gain      Patient Tangela Banuelos is a 31year old female who presents for recheck of her hypertension.     Home Blood Pressure monitoring within the normal range  Current medi EVERY DAY, Disp: 84 tablet, Rfl: 1    No current facility-administered medications on file prior to visit.       Past Medical History:   Diagnosis Date   • Anxiety    • Essential hypertension    • IUD contraception     removed   • Strep pharyngitis       Pa skin color overall wnl  HEENT: atraumatic, normocephalic  EYES: PERRLA, EOMI, conjunctiva clear, no discharge B  NECK: supple, no LAD, no TM, no carotid bruits or JVD bilaterally  LUNGS: good BS B, clear to auscultation B, no wheezing, no rhonchi, no rales and for complete physical exam  - CBC WITH DIFFERENTIAL WITH PLATELET  - COMP METABOLIC PANEL (14)  - LIPID PANEL  - TSH W REFLEX TO FREE T4    7. Encounter for weight management  See above  - Phentermine HCl 37.5 MG Oral Tab;  Take 1 tablet (37.5 mg total)

## 2020-08-13 ENCOUNTER — TELEPHONE (OUTPATIENT)
Dept: FAMILY MEDICINE CLINIC | Facility: CLINIC | Age: 30
End: 2020-08-13

## 2020-08-17 ENCOUNTER — PATIENT MESSAGE (OUTPATIENT)
Dept: FAMILY MEDICINE CLINIC | Facility: CLINIC | Age: 30
End: 2020-08-17

## 2020-09-23 RX ORDER — NORETHINDRONE ACETATE AND ETHINYL ESTRADIOL AND FERROUS FUMARATE 1MG-20(24)
KIT ORAL
Qty: 84 TABLET | Refills: 1 | Status: CANCELLED | OUTPATIENT
Start: 2020-09-23

## 2020-09-24 RX ORDER — NORETHINDRONE ACETATE AND ETHINYL ESTRADIOL 1MG-20(21)
1 KIT ORAL DAILY
Qty: 84 TABLET | Refills: 0 | Status: SHIPPED | OUTPATIENT
Start: 2020-09-24 | End: 2020-12-19

## 2020-09-24 NOTE — TELEPHONE ENCOUNTER
Message left for patient to call us. Wanted to let her know that Junel (brand name) is not likely approved by her insurance, would substitute generic but wanted to make sure she is OK with that. Will await call back.

## 2020-10-02 DIAGNOSIS — I10 ESSENTIAL HYPERTENSION: Chronic | ICD-10-CM

## 2020-10-02 RX ORDER — TRIAMTERENE AND HYDROCHLOROTHIAZIDE 37.5; 25 MG/1; MG/1
1 CAPSULE ORAL EVERY MORNING
Qty: 90 CAPSULE | Refills: 0 | Status: SHIPPED | OUTPATIENT
Start: 2020-10-02 | End: 2021-01-08

## 2020-10-27 DIAGNOSIS — F41.9 ANXIETY: Chronic | ICD-10-CM

## 2020-10-27 RX ORDER — ESCITALOPRAM OXALATE 20 MG/1
TABLET ORAL
Qty: 180 TABLET | Refills: 0 | Status: SHIPPED | OUTPATIENT
Start: 2020-10-27 | End: 2021-02-01

## 2020-12-08 ENCOUNTER — PATIENT MESSAGE (OUTPATIENT)
Dept: FAMILY MEDICINE CLINIC | Facility: CLINIC | Age: 30
End: 2020-12-08

## 2020-12-08 DIAGNOSIS — Z20.822 EXPOSURE TO COVID-19 VIRUS: Primary | ICD-10-CM

## 2020-12-19 RX ORDER — NORETHINDRONE ACETATE AND ETHINYL ESTRADIOL 1MG-20(21)
1 KIT ORAL DAILY
Qty: 84 TABLET | Refills: 1 | Status: SHIPPED | OUTPATIENT
Start: 2020-12-19 | End: 2021-06-07

## 2020-12-19 NOTE — TELEPHONE ENCOUNTER
A refill request was received for:  Requested Prescriptions     Pending Prescriptions Disp Refills   • [facility] FE 1/20 1-20 MG-MCG Oral Tab [Pharmacy Med Name: [facility]  1/20 TABLETS] 84 tablet 0     Sig: TAKE 1 TABLET BY MOUTH DAILY     Last refill airam

## 2020-12-22 DIAGNOSIS — Z76.89 ENCOUNTER FOR WEIGHT MANAGEMENT: ICD-10-CM

## 2020-12-22 DIAGNOSIS — E66.01 MORBID OBESITY DUE TO EXCESS CALORIES (HCC): ICD-10-CM

## 2020-12-22 DIAGNOSIS — Z79.899 LONG-TERM USE OF HIGH-RISK MEDICATION: ICD-10-CM

## 2020-12-22 DIAGNOSIS — F41.1 GENERALIZED ANXIETY DISORDER: ICD-10-CM

## 2020-12-22 NOTE — TELEPHONE ENCOUNTER
A refill request was received for:  Requested Prescriptions     Pending Prescriptions Disp Refills   • Phentermine HCl 37.5 MG Oral Tab 90 tablet 0     Sig: Take 1 tablet (37.5 mg total) by mouth every morning before breakfast.     Last refill date: 8/11/2

## 2020-12-22 NOTE — TELEPHONE ENCOUNTER
A refill request was received for:  Requested Prescriptions     Pending Prescriptions Disp Refills   • Phentermine HCl 37.5 MG Oral Tab 90 tablet 0     Sig: Take 1 tablet (37.5 mg total) by mouth every morning before breakfast.     Last refill date: 08/11/

## 2020-12-23 RX ORDER — PHENTERMINE HYDROCHLORIDE 37.5 MG/1
37.5 TABLET ORAL
Qty: 90 TABLET | Refills: 0 | Status: SHIPPED | OUTPATIENT
Start: 2020-12-23 | End: 2021-04-02

## 2021-01-01 ENCOUNTER — TELEPHONE (OUTPATIENT)
Dept: FAMILY MEDICINE CLINIC | Facility: CLINIC | Age: 31
End: 2021-01-01

## 2021-01-01 NOTE — TELEPHONE ENCOUNTER
Please let pt know (if not already aware) that her Phentermine was denied by her insurance, she can see if there is a coupon online and \"shop around\" for best self-pay prices too with coupon  We can send her a paper copy of the Rx if she wants-BRUNO

## 2021-01-05 NOTE — TELEPHONE ENCOUNTER
Patient returned call, informed her of the medication not being covered by her insurance and possible options.  Pt verbalizes understanding and will check with her pharmacy for the out-of-pocket cost.

## 2021-01-08 DIAGNOSIS — I10 ESSENTIAL HYPERTENSION: Chronic | ICD-10-CM

## 2021-01-08 RX ORDER — TRIAMTERENE AND HYDROCHLOROTHIAZIDE 37.5; 25 MG/1; MG/1
1 CAPSULE ORAL EVERY MORNING
Qty: 90 CAPSULE | Refills: 0 | Status: SHIPPED | OUTPATIENT
Start: 2021-01-08 | End: 2021-04-13

## 2021-01-08 NOTE — TELEPHONE ENCOUNTER
Call pt-LHK I sent in RF(s) and remind that she is due for follow up with me:  Next month  I am booking out 3-4 weeks so make appointment now if able. Thanks!

## 2021-01-08 NOTE — TELEPHONE ENCOUNTER
A refill request was received for:  Requested Prescriptions     Pending Prescriptions Disp Refills   • Triamterene-HCTZ 37.5-25 MG Oral Cap 90 capsule 0     Sig: Take 1 capsule by mouth every morning.      Last refill date: 10/02/2020  Qty:90  Last office v

## 2021-02-01 DIAGNOSIS — F41.9 ANXIETY: Chronic | ICD-10-CM

## 2021-02-01 RX ORDER — ESCITALOPRAM OXALATE 20 MG/1
20 TABLET ORAL 2 TIMES DAILY
Qty: 180 TABLET | Refills: 0 | Status: SHIPPED | OUTPATIENT
Start: 2021-02-01 | End: 2021-03-02

## 2021-02-01 NOTE — TELEPHONE ENCOUNTER
Spoke to pt, informed of being due for F/U. States she will make the appointment online.  Wasn't sure if she wanted a Saturday or a Tuesday at 6pm.

## 2021-02-01 NOTE — TELEPHONE ENCOUNTER
A refill request was received for:  Requested Prescriptions     Pending Prescriptions Disp Refills   • escitalopram 20 MG Oral Tab 180 tablet 0     Sig: Take 1 tablet (20 mg total) by mouth 2 (two) times daily.      Last refill date: 10/27/20  Qty:180  Last

## 2021-02-16 LAB
AMB EXT CHOLESTEROL, TOTAL: 248 MG/DL
AMB EXT COVID-19 IGG: POSITIVE
AMB EXT CREATININE: 0.83 MG/DL
AMB EXT GFR: 108
AMB EXT GLUCOSE: 81 MG/DL
AMB EXT HDL CHOLESTEROL: 44 MG/DL
AMB EXT HEMATOCRIT: 40.9
AMB EXT HEMOGLOBIN: 13.7
AMB EXT LDL CHOLESTEROL, DIRECT: 147 MG/DL
AMB EXT MCV: 93
AMB EXT PLATELETS: 295
AMB EXT TRIGLYCERIDES: 312 MG/DL
AMB EXT TSH: 1.34 MIU/ML
AMB EXT VLDL: 57 MG/DL
AMB EXT WBC: 7 X10(3)UL

## 2021-03-01 ENCOUNTER — TELEPHONE (OUTPATIENT)
Dept: FAMILY MEDICINE CLINIC | Facility: CLINIC | Age: 31
End: 2021-03-01

## 2021-03-01 NOTE — TELEPHONE ENCOUNTER
Received pt's labs ordered on 4/10/20 and 8/11/20 by Dr. Gila Eastman. CMP, CBC, Lipid Panel, TSH and SARS COVID-19 IGG results entered into External Results Console. Report placed on provider's desk for review.

## 2021-03-02 ENCOUNTER — OFFICE VISIT (OUTPATIENT)
Dept: FAMILY MEDICINE CLINIC | Facility: CLINIC | Age: 31
End: 2021-03-02
Payer: COMMERCIAL

## 2021-03-02 VITALS
OXYGEN SATURATION: 96 % | DIASTOLIC BLOOD PRESSURE: 80 MMHG | WEIGHT: 274.38 LBS | BODY MASS INDEX: 48.62 KG/M2 | SYSTOLIC BLOOD PRESSURE: 118 MMHG | HEIGHT: 63 IN | HEART RATE: 88 BPM

## 2021-03-02 DIAGNOSIS — E78.5 DYSLIPIDEMIA: ICD-10-CM

## 2021-03-02 DIAGNOSIS — Z00.00 ROUTINE MEDICAL EXAM: Primary | ICD-10-CM

## 2021-03-02 DIAGNOSIS — Z12.4 SCREENING FOR MALIGNANT NEOPLASM OF CERVIX: ICD-10-CM

## 2021-03-02 DIAGNOSIS — I10 ESSENTIAL HYPERTENSION: ICD-10-CM

## 2021-03-02 DIAGNOSIS — F41.1 GENERALIZED ANXIETY DISORDER: ICD-10-CM

## 2021-03-02 DIAGNOSIS — E66.01 MORBID OBESITY DUE TO EXCESS CALORIES (HCC): ICD-10-CM

## 2021-03-02 PROCEDURE — 3074F SYST BP LT 130 MM HG: CPT | Performed by: FAMILY MEDICINE

## 2021-03-02 PROCEDURE — 3008F BODY MASS INDEX DOCD: CPT | Performed by: FAMILY MEDICINE

## 2021-03-02 PROCEDURE — 99395 PREV VISIT EST AGE 18-39: CPT | Performed by: FAMILY MEDICINE

## 2021-03-02 PROCEDURE — 87624 HPV HI-RISK TYP POOLED RSLT: CPT | Performed by: FAMILY MEDICINE

## 2021-03-02 PROCEDURE — 99213 OFFICE O/P EST LOW 20 MIN: CPT | Performed by: FAMILY MEDICINE

## 2021-03-02 PROCEDURE — 3079F DIAST BP 80-89 MM HG: CPT | Performed by: FAMILY MEDICINE

## 2021-03-02 RX ORDER — ESCITALOPRAM OXALATE 20 MG/1
TABLET ORAL
Qty: 135 TABLET | Refills: 0 | Status: SHIPPED | OUTPATIENT
Start: 2021-03-02 | End: 2021-05-04

## 2021-03-02 RX ORDER — OMEGA-3-ACID ETHYL ESTERS 1 G/1
1 CAPSULE, LIQUID FILLED ORAL 2 TIMES DAILY
Qty: 180 CAPSULE | Refills: 0 | Status: SHIPPED | OUTPATIENT
Start: 2021-03-02 | End: 2021-08-05

## 2021-03-02 RX ORDER — TOPIRAMATE 50 MG/1
TABLET, FILM COATED ORAL
Qty: 180 TABLET | Refills: 0 | Status: SHIPPED | OUTPATIENT
Start: 2021-03-02 | End: 2021-06-10

## 2021-03-03 LAB — HPV I/H RISK 1 DNA SPEC QL NAA+PROBE: NEGATIVE

## 2021-03-09 NOTE — H&P
HPI:   Patient presents with:   Follow - Up  Gyn Exam  Hypertension  Physical  Anxiety  Hyperlipidemia      Daniel Spears is a 27year old female who presents for a complete physical and pap/gyne exam.     Patient has new complaints of:  Hypertension r Omega-3-acid Ethyl Esters 1 g Oral Cap Take 1 capsule (1 g total) by mouth 2 (two) times daily.  180 capsule 0   • escitalopram 20 MG Oral Tab 1 1/2 tab po qd 135 tablet 0   • topiramate 50 MG Oral Tab One tab po at bedtime x 7-10 days then one tab po bid 1 Minutes of Exercise per Session:   Stress:       Feeling of Stress :   Social Connections:       Frequency of Communication with Friends and Family:       Frequency of Social Gatherings with Friends and Family:       Attends Shinto Services:       Activ S4  GI: NABS, soft, non-distended, no masses, no HSM or tenderness  EXTREMITIES: no CCE, DP, PT pulses wnl B  BREAST: no dominant or suspicious mass B, no nipple discharge or retraction B, no skin lesions B, no axillary LAD B, no tenderness B  /GYNE: ext cervix  SA  - THINPREP PAP SMEAR B; Future  - HPV HIGH RISK , THIN PREP COLLECTION; Future  - THINPREP PAP SMEAR B    3. Essential hypertension  Stable, CPM, f/u 6 mo    4.  Generalized anxiety disorder  Appears stable, mood good, no SHIP, will CPM, f/u 4-6 for ANXIETY, HYPERLIPIDEMIA, WEIGHT GAIN, HTN. There are no Patient Instructions on file for this visit. The patient indicates understanding of the above recommendations and agrees to the above plan.   Follow up: as above    Bhavani Bynum MD

## 2021-03-10 ENCOUNTER — TELEPHONE (OUTPATIENT)
Dept: FAMILY MEDICINE CLINIC | Facility: CLINIC | Age: 31
End: 2021-03-10

## 2021-03-22 NOTE — TELEPHONE ENCOUNTER
Call or email pt-LHK not covered, she can get OTC omega fatty acids/fish oil-take equivalent or close to 1 gm BID

## 2021-03-22 NOTE — TELEPHONE ENCOUNTER
Received fax from pt pharmacy. PA for the omega 3 acid ethyl esters was denied. They stated it was not covered because she did not have a high level of fat lipids before drug treatment. ( pre treatment triglyceride levels have to be greater or equal to 500

## 2021-04-02 DIAGNOSIS — Z79.899 LONG-TERM USE OF HIGH-RISK MEDICATION: ICD-10-CM

## 2021-04-02 DIAGNOSIS — Z76.89 ENCOUNTER FOR WEIGHT MANAGEMENT: ICD-10-CM

## 2021-04-02 DIAGNOSIS — F41.1 GENERALIZED ANXIETY DISORDER: ICD-10-CM

## 2021-04-02 DIAGNOSIS — E66.01 MORBID OBESITY DUE TO EXCESS CALORIES (HCC): ICD-10-CM

## 2021-04-02 RX ORDER — PHENTERMINE HYDROCHLORIDE 37.5 MG/1
37.5 TABLET ORAL
Qty: 30 TABLET | Refills: 0 | Status: SHIPPED | OUTPATIENT
Start: 2021-04-02 | End: 2021-05-10

## 2021-04-02 NOTE — TELEPHONE ENCOUNTER
A refill request was received for:  Requested Prescriptions     Pending Prescriptions Disp Refills   • PHENTERMINE HCL 37.5 MG Oral Tab [Pharmacy Med Name: PHENTERMINE 37.5MG TABLETS] 90 tablet 0     Sig: TAKE 1 TABLET(37.5 MG) BY MOUTH EVERY MORNING BEFOR

## 2021-04-13 ENCOUNTER — TELEPHONE (OUTPATIENT)
Dept: FAMILY MEDICINE CLINIC | Facility: CLINIC | Age: 31
End: 2021-04-13

## 2021-04-13 DIAGNOSIS — I10 ESSENTIAL HYPERTENSION: Chronic | ICD-10-CM

## 2021-04-13 RX ORDER — TRIAMTERENE AND HYDROCHLOROTHIAZIDE 37.5; 25 MG/1; MG/1
1 CAPSULE ORAL EVERY MORNING
Qty: 90 CAPSULE | Refills: 0 | Status: SHIPPED | OUTPATIENT
Start: 2021-04-13 | End: 2021-06-07

## 2021-04-13 NOTE — TELEPHONE ENCOUNTER
A refill request was received for:  Requested Prescriptions     Pending Prescriptions Disp Refills   • Triamterene-HCTZ 37.5-25 MG Oral Cap 90 capsule 0     Sig: Take 1 capsule by mouth every morning.      Last refill date: 1/8/21  Qty:90  Last office visit

## 2021-04-15 ENCOUNTER — TELEPHONE (OUTPATIENT)
Dept: FAMILY MEDICINE CLINIC | Facility: CLINIC | Age: 31
End: 2021-04-15

## 2021-04-15 NOTE — TELEPHONE ENCOUNTER
Received a request to complete a PA for Omega 3, which was ordered in 3/2021. Denied at that time. Will await result from today's PA; patient did not read the message dated 3/10/21 instructing patient to obtain Jackson 3 OTC.  F/u with pt after PA result come

## 2021-04-19 NOTE — TELEPHONE ENCOUNTER
Left vm informing pt of PA denial and also informed pt that she will have to use a OTC omega 3. Encouraged pt to call our office back if she has any additional questions or concerns.

## 2021-05-04 DIAGNOSIS — F41.1 GENERALIZED ANXIETY DISORDER: ICD-10-CM

## 2021-05-04 RX ORDER — ESCITALOPRAM OXALATE 20 MG/1
TABLET ORAL
Qty: 135 TABLET | Refills: 0 | Status: SHIPPED | OUTPATIENT
Start: 2021-05-04 | End: 2021-08-05

## 2021-05-04 NOTE — TELEPHONE ENCOUNTER
A refill request was received for:  Requested Prescriptions     Pending Prescriptions Disp Refills   • escitalopram 20 MG Oral Tab 135 tablet 0     Si  tab po qd     Last refill date: 3/2/21  Qty:180  Last office visit: 3/2/21  When is follow up due

## 2021-05-10 DIAGNOSIS — Z79.899 LONG-TERM USE OF HIGH-RISK MEDICATION: ICD-10-CM

## 2021-05-10 DIAGNOSIS — Z76.89 ENCOUNTER FOR WEIGHT MANAGEMENT: ICD-10-CM

## 2021-05-10 DIAGNOSIS — F41.1 GENERALIZED ANXIETY DISORDER: ICD-10-CM

## 2021-05-10 DIAGNOSIS — E66.01 MORBID OBESITY DUE TO EXCESS CALORIES (HCC): ICD-10-CM

## 2021-05-10 RX ORDER — PHENTERMINE HYDROCHLORIDE 37.5 MG/1
37.5 TABLET ORAL
Qty: 30 TABLET | Refills: 1 | Status: SHIPPED | OUTPATIENT
Start: 2021-05-10 | End: 2021-07-12

## 2021-05-10 NOTE — TELEPHONE ENCOUNTER
A refill request was received for:  Requested Prescriptions     Pending Prescriptions Disp Refills   • Phentermine HCl 37.5 MG Oral Tab 30 tablet 0     Sig: Take 1 tablet (37.5 mg total) by mouth every morning before breakfast.     Last refill date:4/2/21

## 2021-06-07 ENCOUNTER — TELEPHONE (OUTPATIENT)
Dept: FAMILY MEDICINE CLINIC | Facility: CLINIC | Age: 31
End: 2021-06-07

## 2021-06-07 DIAGNOSIS — I10 ESSENTIAL HYPERTENSION: Chronic | ICD-10-CM

## 2021-06-07 RX ORDER — TRIAMTERENE AND HYDROCHLOROTHIAZIDE 37.5; 25 MG/1; MG/1
1 CAPSULE ORAL EVERY MORNING
Qty: 90 CAPSULE | Refills: 0 | Status: SHIPPED | OUTPATIENT
Start: 2021-06-07 | End: 2021-09-07

## 2021-06-07 RX ORDER — NORETHINDRONE ACETATE AND ETHINYL ESTRADIOL 1MG-20(21)
1 KIT ORAL DAILY
Qty: 84 TABLET | Refills: 0 | Status: SHIPPED | OUTPATIENT
Start: 2021-06-07 | End: 2021-09-02

## 2021-06-07 NOTE — TELEPHONE ENCOUNTER
A refill request was received for:  Requested Prescriptions     Pending Prescriptions Disp Refills   • Triamterene-HCTZ 37.5-25 MG Oral Cap 90 capsule 0     Sig: Take 1 capsule by mouth every morning.      Signed Prescriptions Disp Refills   • Norethin Ace-

## 2021-06-07 NOTE — TELEPHONE ENCOUNTER
A refill request was received for:  Requested Prescriptions     Pending Prescriptions Disp Refills   • Norethin Ace-Eth Estrad-FE (AUROVELA FE 1/20) 1-20 MG-MCG Oral Tab 84 tablet 1     Sig: Take 1 tablet by mouth daily.      Last refill date: 12/19/20  Isabella Lea

## 2021-06-10 ENCOUNTER — TELEPHONE (OUTPATIENT)
Dept: FAMILY MEDICINE CLINIC | Facility: CLINIC | Age: 31
End: 2021-06-10

## 2021-06-10 DIAGNOSIS — E66.01 MORBID OBESITY DUE TO EXCESS CALORIES (HCC): ICD-10-CM

## 2021-06-10 RX ORDER — TOPIRAMATE 50 MG/1
TABLET, FILM COATED ORAL
Qty: 60 TABLET | Refills: 0 | Status: SHIPPED | OUTPATIENT
Start: 2021-06-10 | End: 2021-08-23

## 2021-06-10 RX ORDER — TOPIRAMATE 50 MG/1
TABLET, FILM COATED ORAL
Qty: 162 TABLET | Refills: 0 | OUTPATIENT
Start: 2021-06-10

## 2021-06-10 NOTE — TELEPHONE ENCOUNTER
A refill request was received for:  Requested Prescriptions     Pending Prescriptions Disp Refills   • topiramate 50 MG Oral Tab 180 tablet 0     Sig: One tab po at bedtime x 7-10 days then one tab po bid     Last refill date: 3/2/21  Qty:180  Last office

## 2021-07-12 DIAGNOSIS — Z76.89 ENCOUNTER FOR WEIGHT MANAGEMENT: ICD-10-CM

## 2021-07-12 DIAGNOSIS — E66.01 MORBID OBESITY DUE TO EXCESS CALORIES (HCC): ICD-10-CM

## 2021-07-12 DIAGNOSIS — Z79.899 LONG-TERM USE OF HIGH-RISK MEDICATION: ICD-10-CM

## 2021-07-12 DIAGNOSIS — F41.1 GENERALIZED ANXIETY DISORDER: ICD-10-CM

## 2021-07-12 RX ORDER — PHENTERMINE HYDROCHLORIDE 37.5 MG/1
37.5 TABLET ORAL
Qty: 30 TABLET | Refills: 0 | Status: SHIPPED | OUTPATIENT
Start: 2021-07-12 | End: 2021-08-23

## 2021-07-12 NOTE — TELEPHONE ENCOUNTER
A refill request was received for:  Requested Prescriptions     Pending Prescriptions Disp Refills   • Phentermine HCl 37.5 MG Oral Tab 30 tablet 1     Sig: Take 1 tablet (37.5 mg total) by mouth every morning before breakfast.     Last refill date: 05/10/

## 2021-08-05 DIAGNOSIS — F41.1 GENERALIZED ANXIETY DISORDER: ICD-10-CM

## 2021-08-05 RX ORDER — ESCITALOPRAM OXALATE 20 MG/1
TABLET ORAL
Qty: 135 TABLET | Refills: 0 | Status: SHIPPED | OUTPATIENT
Start: 2021-08-05 | End: 2021-11-11

## 2021-08-05 RX ORDER — OMEGA-3-ACID ETHYL ESTERS 1 G/1
1 CAPSULE, LIQUID FILLED ORAL 2 TIMES DAILY
Qty: 180 CAPSULE | Refills: 0 | Status: SHIPPED | OUTPATIENT
Start: 2021-08-05 | End: 2021-09-21

## 2021-08-05 NOTE — TELEPHONE ENCOUNTER
A refill request was received for:  Requested Prescriptions     Pending Prescriptions Disp Refills   • escitalopram 20 MG Oral Tab 135 tablet 0     Si 1/2 tab po qd   • Omega-3-acid Ethyl Esters 1 g Oral Cap 180 capsule 0     Sig: Take 1 capsule (1 g t

## 2021-08-05 NOTE — TELEPHONE ENCOUNTER
A refill request was received for:  Requested Prescriptions     Pending Prescriptions Disp Refills   • escitalopram 20 MG Oral Tab 135 tablet 0     Si  tab po qd     Last refill date: 21  Qty: 135 (90 days)  Last office visit: 3/2/2021  When is

## 2021-08-23 DIAGNOSIS — Z76.89 ENCOUNTER FOR WEIGHT MANAGEMENT: ICD-10-CM

## 2021-08-23 DIAGNOSIS — F41.1 GENERALIZED ANXIETY DISORDER: ICD-10-CM

## 2021-08-23 DIAGNOSIS — E66.01 MORBID OBESITY DUE TO EXCESS CALORIES (HCC): ICD-10-CM

## 2021-08-23 DIAGNOSIS — Z79.899 LONG-TERM USE OF HIGH-RISK MEDICATION: ICD-10-CM

## 2021-08-23 RX ORDER — TOPIRAMATE 50 MG/1
50 TABLET, FILM COATED ORAL 2 TIMES DAILY
Qty: 180 TABLET | Refills: 0 | Status: SHIPPED | OUTPATIENT
Start: 2021-08-23 | End: 2021-09-21

## 2021-08-23 RX ORDER — PHENTERMINE HYDROCHLORIDE 37.5 MG/1
37.5 TABLET ORAL
Qty: 30 TABLET | Refills: 0 | Status: SHIPPED | OUTPATIENT
Start: 2021-08-23 | End: 2021-09-21

## 2021-08-23 NOTE — TELEPHONE ENCOUNTER
A refill request was received for:  Requested Prescriptions     Pending Prescriptions Disp Refills   • topiramate 50 MG Oral Tab 60 tablet 0     Sig: One tab po at bedtime x 7-10 days then one tab po bid   • Phentermine HCl 37.5 MG Oral Tab 30 tablet 0

## 2021-09-02 RX ORDER — NORETHINDRONE ACETATE AND ETHINYL ESTRADIOL 1MG-20(21)
1 KIT ORAL DAILY
Qty: 84 TABLET | Refills: 1 | Status: SHIPPED | OUTPATIENT
Start: 2021-09-02

## 2021-09-02 NOTE — TELEPHONE ENCOUNTER
A refill request was received for:  Requested Prescriptions     Pending Prescriptions Disp Refills   • Norethin Ace-Eth Estrad-FE (AUROVELA FE 1/20) 1-20 MG-MCG Oral Tab 84 tablet 0     Sig: Take 1 tablet by mouth daily.      Last refill date: 06/07/2021  Q

## 2021-09-07 DIAGNOSIS — I10 ESSENTIAL HYPERTENSION: Chronic | ICD-10-CM

## 2021-09-07 RX ORDER — TRIAMTERENE AND HYDROCHLOROTHIAZIDE 37.5; 25 MG/1; MG/1
1 CAPSULE ORAL EVERY MORNING
Qty: 90 CAPSULE | Refills: 0 | Status: SHIPPED | OUTPATIENT
Start: 2021-09-07 | End: 2021-12-14

## 2021-09-07 NOTE — TELEPHONE ENCOUNTER
A refill request was received for:  Requested Prescriptions     Pending Prescriptions Disp Refills   • triamterene-hydroCHLOROthiazide 37.5-25 MG Oral Cap 90 capsule 0     Sig: Take 1 capsule by mouth every morning.      Last refill date: 06/07/2021  Qty:90

## 2021-09-21 ENCOUNTER — OFFICE VISIT (OUTPATIENT)
Dept: FAMILY MEDICINE CLINIC | Facility: CLINIC | Age: 31
End: 2021-09-21
Payer: COMMERCIAL

## 2021-09-21 VITALS
WEIGHT: 265 LBS | HEIGHT: 63 IN | BODY MASS INDEX: 46.95 KG/M2 | SYSTOLIC BLOOD PRESSURE: 124 MMHG | HEART RATE: 78 BPM | DIASTOLIC BLOOD PRESSURE: 80 MMHG | OXYGEN SATURATION: 99 %

## 2021-09-21 DIAGNOSIS — F41.1 GENERALIZED ANXIETY DISORDER: ICD-10-CM

## 2021-09-21 DIAGNOSIS — Z76.89 ENCOUNTER FOR WEIGHT MANAGEMENT: ICD-10-CM

## 2021-09-21 DIAGNOSIS — Z79.899 LONG-TERM USE OF HIGH-RISK MEDICATION: ICD-10-CM

## 2021-09-21 DIAGNOSIS — B35.4 TINEA CORPORIS: ICD-10-CM

## 2021-09-21 DIAGNOSIS — E66.01 MORBID OBESITY DUE TO EXCESS CALORIES (HCC): ICD-10-CM

## 2021-09-21 DIAGNOSIS — I10 ESSENTIAL HYPERTENSION: Primary | ICD-10-CM

## 2021-09-21 DIAGNOSIS — E78.2 MIXED HYPERLIPIDEMIA: ICD-10-CM

## 2021-09-21 PROCEDURE — 3008F BODY MASS INDEX DOCD: CPT | Performed by: FAMILY MEDICINE

## 2021-09-21 PROCEDURE — 99215 OFFICE O/P EST HI 40 MIN: CPT | Performed by: FAMILY MEDICINE

## 2021-09-21 PROCEDURE — 3074F SYST BP LT 130 MM HG: CPT | Performed by: FAMILY MEDICINE

## 2021-09-21 PROCEDURE — 3079F DIAST BP 80-89 MM HG: CPT | Performed by: FAMILY MEDICINE

## 2021-09-21 RX ORDER — PHENTERMINE HYDROCHLORIDE 37.5 MG/1
37.5 TABLET ORAL
Qty: 90 TABLET | Refills: 0 | Status: SHIPPED
Start: 2021-09-21 | End: 2021-09-21

## 2021-09-21 RX ORDER — OMEGA-3-ACID ETHYL ESTERS 1 G/1
1 CAPSULE, LIQUID FILLED ORAL 2 TIMES DAILY
Qty: 180 CAPSULE | Refills: 0 | Status: SHIPPED | OUTPATIENT
Start: 2021-09-21

## 2021-09-21 RX ORDER — PHENTERMINE HYDROCHLORIDE 37.5 MG/1
37.5 TABLET ORAL
Qty: 90 TABLET | Refills: 0 | Status: SHIPPED | OUTPATIENT
Start: 2021-09-21 | End: 2022-01-05

## 2021-09-21 RX ORDER — CHLORAL HYDRATE 500 MG
1 CAPSULE ORAL AS DIRECTED
COMMUNITY
Start: 2021-08-05

## 2021-09-21 RX ORDER — KETOCONAZOLE 20 MG/G
1 CREAM TOPICAL 2 TIMES DAILY
Qty: 30 G | Refills: 1 | Status: SHIPPED | OUTPATIENT
Start: 2021-09-21

## 2021-09-21 RX ORDER — TOPIRAMATE 50 MG/1
50 TABLET, FILM COATED ORAL 2 TIMES DAILY
Qty: 180 TABLET | Refills: 0 | Status: SHIPPED | OUTPATIENT
Start: 2021-09-21 | End: 2021-12-27

## 2021-09-28 ENCOUNTER — PATIENT MESSAGE (OUTPATIENT)
Dept: FAMILY MEDICINE CLINIC | Facility: CLINIC | Age: 31
End: 2021-09-28

## 2021-09-29 NOTE — TELEPHONE ENCOUNTER
From: Magda Redmond  To: Rich Castellon MD  Sent: 9/28/2021 4:42 PM CDT  Subject: Martha Byrd! Just wanted to update you that my ringworm is finally healing!! I attached 3 photos. One is the day in the office.  One is 2

## 2021-11-11 DIAGNOSIS — F41.1 GENERALIZED ANXIETY DISORDER: ICD-10-CM

## 2021-11-11 RX ORDER — ESCITALOPRAM OXALATE 20 MG/1
TABLET ORAL
Qty: 135 TABLET | Refills: 0 | Status: SHIPPED | OUTPATIENT
Start: 2021-11-11

## 2021-11-11 NOTE — TELEPHONE ENCOUNTER
A refill request was received for:  Requested Prescriptions     Pending Prescriptions Disp Refills   • escitalopram 20 MG Oral Tab [Pharmacy Med Name: ESCITALOPRAM 20MG TABLETS] 135 tablet 0     Sig: TAKE 1 AND 1/2 TABLETS BY MOUTH EVERY DAY     Last refil

## 2021-12-14 DIAGNOSIS — I10 ESSENTIAL HYPERTENSION: Chronic | ICD-10-CM

## 2021-12-14 RX ORDER — TRIAMTERENE AND HYDROCHLOROTHIAZIDE 37.5; 25 MG/1; MG/1
1 CAPSULE ORAL EVERY MORNING
Qty: 90 CAPSULE | Refills: 0 | Status: SHIPPED | OUTPATIENT
Start: 2021-12-14

## 2021-12-14 NOTE — TELEPHONE ENCOUNTER
A refill request was received for:  Requested Prescriptions     Pending Prescriptions Disp Refills   • TRIAMTERENE-HYDROCHLOROTHIAZIDE 37.5-25 MG Oral Cap [Pharmacy Med Name: TRIAMTERENE 37.5MG/ HCTZ 25MG CAPS] 90 capsule 0     Sig: TAKE 1 CAPSULE BY MOUTH

## 2021-12-25 DIAGNOSIS — E66.01 MORBID OBESITY DUE TO EXCESS CALORIES (HCC): ICD-10-CM

## 2021-12-27 RX ORDER — TOPIRAMATE 50 MG/1
TABLET, FILM COATED ORAL
Qty: 180 TABLET | Refills: 0 | Status: SHIPPED | OUTPATIENT
Start: 2021-12-27

## 2021-12-27 NOTE — TELEPHONE ENCOUNTER
A refill request was received for:  Requested Prescriptions     Pending Prescriptions Disp Refills   • TOPIRAMATE 50 MG Oral Tab [Pharmacy Med Name: TOPIRAMATE 50MG TABLETS] 180 tablet 0     Sig: TAKE 1 TABLET(50 MG) BY MOUTH TWICE DAILY     Last refill da

## 2022-01-05 DIAGNOSIS — Z76.89 ENCOUNTER FOR WEIGHT MANAGEMENT: ICD-10-CM

## 2022-01-05 DIAGNOSIS — E66.01 MORBID OBESITY DUE TO EXCESS CALORIES (HCC): ICD-10-CM

## 2022-01-05 DIAGNOSIS — Z79.899 LONG-TERM USE OF HIGH-RISK MEDICATION: ICD-10-CM

## 2022-01-05 RX ORDER — PHENTERMINE HYDROCHLORIDE 37.5 MG/1
37.5 TABLET ORAL
Qty: 45 TABLET | Refills: 0 | Status: SHIPPED | OUTPATIENT
Start: 2022-01-05

## 2022-01-05 NOTE — TELEPHONE ENCOUNTER
Requested Prescriptions     Pending Prescriptions Disp Refills   • Phentermine HCl 37.5 MG Oral Tab 90 tablet 0     Sig: Take 1 tablet (37.5 mg total) by mouth every morning before breakfast.     LAST REFILL DATE 09/01/2021   QUANTITY REQUESTED 90    #

## 2022-02-14 ENCOUNTER — TELEPHONE (OUTPATIENT)
Dept: FAMILY MEDICINE CLINIC | Facility: CLINIC | Age: 32
End: 2022-02-14

## 2022-02-14 NOTE — TELEPHONE ENCOUNTER
pt sent test results for UTI , She went to [address] [place] [address] , but would like to have Dr Charlene Freire opinion about treatment given to her .

## 2022-02-21 RX ORDER — ESCITALOPRAM OXALATE 20 MG/1
TABLET ORAL
Qty: 135 TABLET | Refills: 0 | Status: SHIPPED | OUTPATIENT
Start: 2022-02-21

## 2022-02-22 RX ORDER — NORETHINDRONE ACETATE AND ETHINYL ESTRADIOL 1MG-20(21)
1 KIT ORAL DAILY
Qty: 84 TABLET | Refills: 1 | Status: SHIPPED | OUTPATIENT
Start: 2022-02-22

## 2022-03-01 RX ORDER — PHENTERMINE HYDROCHLORIDE 37.5 MG/1
TABLET ORAL
Qty: 45 TABLET | Refills: 0 | Status: SHIPPED | OUTPATIENT
Start: 2022-03-01 | End: 2022-03-08

## 2022-03-08 ENCOUNTER — TELEMEDICINE (OUTPATIENT)
Dept: FAMILY MEDICINE CLINIC | Facility: CLINIC | Age: 32
End: 2022-03-08
Payer: COMMERCIAL

## 2022-03-08 DIAGNOSIS — E66.01 MORBID OBESITY DUE TO EXCESS CALORIES (HCC): Primary | ICD-10-CM

## 2022-03-08 DIAGNOSIS — Z79.899 LONG-TERM USE OF HIGH-RISK MEDICATION: ICD-10-CM

## 2022-03-08 DIAGNOSIS — Z76.89 ENCOUNTER FOR WEIGHT MANAGEMENT: ICD-10-CM

## 2022-03-08 DIAGNOSIS — E78.2 MIXED HYPERLIPIDEMIA: ICD-10-CM

## 2022-03-08 DIAGNOSIS — I10 ESSENTIAL HYPERTENSION: ICD-10-CM

## 2022-03-08 DIAGNOSIS — F41.1 GENERALIZED ANXIETY DISORDER: ICD-10-CM

## 2022-03-08 PROCEDURE — 99214 OFFICE O/P EST MOD 30 MIN: CPT | Performed by: FAMILY MEDICINE

## 2022-03-09 RX ORDER — PHENTERMINE HYDROCHLORIDE 37.5 MG/1
37.5 TABLET ORAL
Qty: 45 TABLET | Refills: 0 | Status: SHIPPED | OUTPATIENT
Start: 2022-04-07

## 2022-03-10 ENCOUNTER — TELEPHONE (OUTPATIENT)
Dept: FAMILY MEDICINE CLINIC | Facility: CLINIC | Age: 32
End: 2022-03-10

## 2022-03-10 NOTE — TELEPHONE ENCOUNTER
Received vm from pt, bit by dog yesterday, superficial bite. Did go to a local clinic yesterday, given tetanus vax. Looking for rabies vax as dog is not UTD on vaccines. Confirmed with IC NP - would have to be initiated in ER if suspected. Having rabies is rare, likely not warranted. I spoke with pt, reviewed above. States cut on thigh from bite, was not deep. Site thoroughly cleaned at clinic. Dog was of a friend. Reviewed and discussed should wound worsen or she develops new s/s to go to ER/IC. Pt verbalized understanding and agreeable with plan.

## 2022-03-15 ENCOUNTER — TELEPHONE (OUTPATIENT)
Dept: FAMILY MEDICINE CLINIC | Facility: CLINIC | Age: 32
End: 2022-03-15

## 2022-03-15 NOTE — TELEPHONE ENCOUNTER
I received a fax from [facility] for Phentermine   Case id# [id number]  To appeal, letter can be faxed to [phone]

## 2022-03-17 NOTE — TELEPHONE ENCOUNTER
Received call from [facility] with optum. Said phentermine PA is denied - refill too soon. She already notified pt. Per pt, she picked up rx on 3/1/22.   Ph: [phone]

## 2022-03-23 RX ORDER — TRIAMTERENE AND HYDROCHLOROTHIAZIDE 37.5; 25 MG/1; MG/1
1 CAPSULE ORAL EVERY MORNING
Qty: 90 CAPSULE | Refills: 0 | Status: SHIPPED | OUTPATIENT
Start: 2022-03-23

## 2022-04-06 RX ORDER — TOPIRAMATE 50 MG/1
TABLET, FILM COATED ORAL
Qty: 180 TABLET | Refills: 0 | Status: SHIPPED | OUTPATIENT
Start: 2022-04-06

## 2022-05-02 RX ORDER — OMEGA-3-ACID ETHYL ESTERS 1 G/1
CAPSULE, LIQUID FILLED ORAL
Qty: 180 CAPSULE | Refills: 0 | Status: SHIPPED | OUTPATIENT
Start: 2022-05-02

## 2022-05-31 DIAGNOSIS — Z76.89 ENCOUNTER FOR WEIGHT MANAGEMENT: ICD-10-CM

## 2022-05-31 DIAGNOSIS — E66.01 MORBID OBESITY DUE TO EXCESS CALORIES (HCC): ICD-10-CM

## 2022-05-31 DIAGNOSIS — Z79.899 LONG-TERM USE OF HIGH-RISK MEDICATION: ICD-10-CM

## 2022-05-31 RX ORDER — PHENTERMINE HYDROCHLORIDE 37.5 MG/1
37.5 TABLET ORAL
Qty: 90 TABLET | Refills: 0 | Status: SHIPPED | OUTPATIENT
Start: 2022-05-31

## 2022-06-01 NOTE — TELEPHONE ENCOUNTER
Call pt-LHK I sent in RF(s) and remind that she is due for follow up with me: In July  I am booking out 3-4 weeks so make appointment now if able. Thanks!

## 2022-06-17 DIAGNOSIS — I10 ESSENTIAL HYPERTENSION: Chronic | ICD-10-CM

## 2022-06-17 RX ORDER — TRIAMTERENE AND HYDROCHLOROTHIAZIDE 37.5; 25 MG/1; MG/1
1 CAPSULE ORAL EVERY MORNING
Qty: 90 CAPSULE | Refills: 0 | Status: SHIPPED | OUTPATIENT
Start: 2022-06-17

## 2022-07-17 DIAGNOSIS — F41.1 GENERALIZED ANXIETY DISORDER: ICD-10-CM

## 2022-07-18 RX ORDER — ESCITALOPRAM OXALATE 20 MG/1
TABLET ORAL
Qty: 135 TABLET | Refills: 0 | Status: SHIPPED | OUTPATIENT
Start: 2022-07-18

## 2022-08-05 ENCOUNTER — EKG ENCOUNTER (OUTPATIENT)
Dept: LAB | Age: 32
End: 2022-08-05
Attending: FAMILY MEDICINE
Payer: COMMERCIAL

## 2022-08-05 ENCOUNTER — OFFICE VISIT (OUTPATIENT)
Dept: FAMILY MEDICINE CLINIC | Facility: CLINIC | Age: 32
End: 2022-08-05
Payer: COMMERCIAL

## 2022-08-05 ENCOUNTER — LAB ENCOUNTER (OUTPATIENT)
Dept: LAB | Facility: REFERENCE LAB | Age: 32
End: 2022-08-05
Attending: FAMILY MEDICINE
Payer: COMMERCIAL

## 2022-08-05 VITALS
SYSTOLIC BLOOD PRESSURE: 130 MMHG | OXYGEN SATURATION: 98 % | HEIGHT: 63 IN | HEART RATE: 74 BPM | WEIGHT: 264 LBS | BODY MASS INDEX: 46.78 KG/M2 | DIASTOLIC BLOOD PRESSURE: 86 MMHG

## 2022-08-05 DIAGNOSIS — Z76.89 ENCOUNTER FOR WEIGHT MANAGEMENT: ICD-10-CM

## 2022-08-05 DIAGNOSIS — F41.1 GENERALIZED ANXIETY DISORDER: ICD-10-CM

## 2022-08-05 DIAGNOSIS — R61 NIGHT SWEATS: ICD-10-CM

## 2022-08-05 DIAGNOSIS — L65.9 HAIR LOSS: ICD-10-CM

## 2022-08-05 DIAGNOSIS — N91.2 AMENORRHEA: ICD-10-CM

## 2022-08-05 DIAGNOSIS — I10 ESSENTIAL HYPERTENSION: ICD-10-CM

## 2022-08-05 DIAGNOSIS — Z00.00 ROUTINE MEDICAL EXAM: ICD-10-CM

## 2022-08-05 DIAGNOSIS — E66.01 MORBID OBESITY DUE TO EXCESS CALORIES (HCC): ICD-10-CM

## 2022-08-05 DIAGNOSIS — E78.2 MIXED HYPERLIPIDEMIA: ICD-10-CM

## 2022-08-05 DIAGNOSIS — Z79.899 LONG-TERM USE OF HIGH-RISK MEDICATION: ICD-10-CM

## 2022-08-05 DIAGNOSIS — Z00.00 ROUTINE MEDICAL EXAM: Primary | ICD-10-CM

## 2022-08-05 LAB
ALBUMIN SERPL-MCNC: 3.4 G/DL (ref 3.4–5)
ALBUMIN/GLOB SERPL: 0.8 {RATIO} (ref 1–2)
ALP LIVER SERPL-CCNC: 53 U/L
ALT SERPL-CCNC: 18 U/L
ANION GAP SERPL CALC-SCNC: 5 MMOL/L (ref 0–18)
AST SERPL-CCNC: 11 U/L (ref 15–37)
BASOPHILS # BLD AUTO: 0.06 X10(3) UL (ref 0–0.2)
BASOPHILS NFR BLD AUTO: 0.7 %
BILIRUB SERPL-MCNC: 0.4 MG/DL (ref 0.1–2)
BUN BLD-MCNC: 13 MG/DL (ref 7–18)
BUN/CREAT SERPL: 17.1 (ref 10–20)
CALCIUM BLD-MCNC: 9.1 MG/DL (ref 8.5–10.1)
CHLORIDE SERPL-SCNC: 106 MMOL/L (ref 98–112)
CHOLEST SERPL-MCNC: 233 MG/DL (ref ?–200)
CO2 SERPL-SCNC: 27 MMOL/L (ref 21–32)
CREAT BLD-MCNC: 0.76 MG/DL
DEPRECATED RDW RBC AUTO: 40 FL (ref 35.1–46.3)
DHEA-S SERPL-MCNC: 159.5 UG/DL
EOSINOPHIL # BLD AUTO: 0.21 X10(3) UL (ref 0–0.7)
EOSINOPHIL NFR BLD AUTO: 2.6 %
ERYTHROCYTE [DISTWIDTH] IN BLOOD BY AUTOMATED COUNT: 12 % (ref 11–15)
ESTRADIOL SERPL-MCNC: 48.5 PG/ML
FASTING PATIENT LIPID ANSWER: YES
FASTING STATUS PATIENT QL REPORTED: YES
FSH SERPL-ACNC: 2.8 MIU/ML
GFR SERPLBLD BASED ON 1.73 SQ M-ARVRAT: 106 ML/MIN/1.73M2 (ref 60–?)
GLOBULIN PLAS-MCNC: 4.5 G/DL (ref 2.8–4.4)
GLUCOSE BLD-MCNC: 86 MG/DL (ref 70–99)
HCT VFR BLD AUTO: 41.3 %
HDLC SERPL-MCNC: 47 MG/DL (ref 40–59)
HGB BLD-MCNC: 14.1 G/DL
IMM GRANULOCYTES # BLD AUTO: 0.01 X10(3) UL (ref 0–1)
IMM GRANULOCYTES NFR BLD: 0.1 %
LDLC SERPL CALC-MCNC: 142 MG/DL (ref ?–100)
LH SERPL-ACNC: 6.5 MIU/ML
LYMPHOCYTES # BLD AUTO: 2.43 X10(3) UL (ref 1–4)
LYMPHOCYTES NFR BLD AUTO: 30 %
MCH RBC QN AUTO: 31.3 PG (ref 26–34)
MCHC RBC AUTO-ENTMCNC: 34.1 G/DL (ref 31–37)
MCV RBC AUTO: 91.6 FL
MONOCYTES # BLD AUTO: 0.33 X10(3) UL (ref 0.1–1)
MONOCYTES NFR BLD AUTO: 4.1 %
NEUTROPHILS # BLD AUTO: 5.06 X10 (3) UL (ref 1.5–7.7)
NEUTROPHILS # BLD AUTO: 5.06 X10(3) UL (ref 1.5–7.7)
NEUTROPHILS NFR BLD AUTO: 62.5 %
NONHDLC SERPL-MCNC: 186 MG/DL (ref ?–130)
OSMOLALITY SERPL CALC.SUM OF ELEC: 285 MOSM/KG (ref 275–295)
PLATELET # BLD AUTO: 307 10(3)UL (ref 150–450)
POTASSIUM SERPL-SCNC: 3.9 MMOL/L (ref 3.5–5.1)
PROGEST SERPL-MCNC: 0.75 NG/ML
PROLACTIN SERPL-MCNC: 6.1 NG/ML
PROT SERPL-MCNC: 7.9 G/DL (ref 6.4–8.2)
RBC # BLD AUTO: 4.51 X10(6)UL
SODIUM SERPL-SCNC: 138 MMOL/L (ref 136–145)
TRIGL SERPL-MCNC: 245 MG/DL (ref 30–149)
TSI SER-ACNC: 0.7 MIU/ML (ref 0.36–3.74)
VLDLC SERPL CALC-MCNC: 46 MG/DL (ref 0–30)
WBC # BLD AUTO: 8.1 X10(3) UL (ref 4–11)

## 2022-08-05 PROCEDURE — 84146 ASSAY OF PROLACTIN: CPT

## 2022-08-05 PROCEDURE — 83002 ASSAY OF GONADOTROPIN (LH): CPT

## 2022-08-05 PROCEDURE — 82627 DEHYDROEPIANDROSTERONE: CPT

## 2022-08-05 PROCEDURE — 93010 ELECTROCARDIOGRAM REPORT: CPT | Performed by: FAMILY MEDICINE

## 2022-08-05 PROCEDURE — 36415 COLL VENOUS BLD VENIPUNCTURE: CPT

## 2022-08-05 PROCEDURE — 82670 ASSAY OF TOTAL ESTRADIOL: CPT

## 2022-08-05 PROCEDURE — 80053 COMPREHEN METABOLIC PANEL: CPT | Performed by: FAMILY MEDICINE

## 2022-08-05 PROCEDURE — 85025 COMPLETE CBC W/AUTO DIFF WBC: CPT | Performed by: FAMILY MEDICINE

## 2022-08-05 PROCEDURE — 80061 LIPID PANEL: CPT | Performed by: FAMILY MEDICINE

## 2022-08-05 PROCEDURE — 83001 ASSAY OF GONADOTROPIN (FSH): CPT

## 2022-08-05 PROCEDURE — 93005 ELECTROCARDIOGRAM TRACING: CPT

## 2022-08-05 PROCEDURE — 84443 ASSAY THYROID STIM HORMONE: CPT

## 2022-08-05 PROCEDURE — 84144 ASSAY OF PROGESTERONE: CPT

## 2022-08-06 DIAGNOSIS — E78.2 MIXED HYPERLIPIDEMIA: Primary | ICD-10-CM

## 2022-08-06 DIAGNOSIS — R74.8 ABNORMAL LIVER ENZYMES: ICD-10-CM

## 2022-08-07 NOTE — PROGRESS NOTES
Jeff Roche,    Attached are the results of your recently performed labs/tests: All results are essentially within NORMAL limits. EXCEPT:    Your AST, a liver enzyme, was mildly decreased. Your globulin, a protein, was slightly elevated. Your lipids: Cholesterol and Triglycerides were moderately elevated. Please let me know whether you are restricting cholesterol and triglyceride in your diet and whether you are taking the omega-3 fatty acids consistently and if yes, at what dose. I also recommend watching your diet (a low fat and cholesterol, well balanced Mediterranean or DASH diet) more carefully and exercising 3-4 times a week for at least 30 minutes each time (or as tolerated). Please:   Recheck as below: The lipids and an hepatic panel in 3 months. Follow up:  Pending the results. With me then and pending the response to my questions above.     Take care,     Johnie Meng MD  8/6/2022    (Report(s) are attached, future lab/test orders or any referrals are in your chart/MyChart or will be mailed to you)

## 2022-08-07 NOTE — PROGRESS NOTES
Jeff Roche,    Attached are the results of your recently performed labs/tests: All results are essentially within NORMAL limits.     Take care,     Netta Isaac MD  8/6/2022    (Report(s) are attached, future lab/test orders or any referrals are in your chart/MyChart or will be mailed to you)

## 2022-08-30 ENCOUNTER — PATIENT MESSAGE (OUTPATIENT)
Dept: FAMILY MEDICINE CLINIC | Facility: CLINIC | Age: 32
End: 2022-08-30

## 2022-08-30 DIAGNOSIS — E66.01 MORBID OBESITY DUE TO EXCESS CALORIES (HCC): ICD-10-CM

## 2022-08-30 DIAGNOSIS — Z79.899 LONG-TERM USE OF HIGH-RISK MEDICATION: ICD-10-CM

## 2022-08-30 DIAGNOSIS — Z76.89 ENCOUNTER FOR WEIGHT MANAGEMENT: ICD-10-CM

## 2022-08-30 RX ORDER — NORETHINDRONE ACETATE AND ETHINYL ESTRADIOL 1MG-20(21)
KIT ORAL
Qty: 84 TABLET | Refills: 1 | Status: SHIPPED | OUTPATIENT
Start: 2022-08-30

## 2022-08-30 NOTE — TELEPHONE ENCOUNTER
From: Haydee Rubio  To: Morgan Worrell MD  Sent: 8/30/2022 1:58 PM CDT  Subject: Julieth coffman! I have been trying to refill my birth control medicine since about 5 days after my appointment with you! They will not refill my medicine because they said I need to come see you! But I just saw you! And I scheduled an appointment in November to come see you! Can you please fill it!

## 2022-08-31 RX ORDER — PHENTERMINE HYDROCHLORIDE 37.5 MG/1
TABLET ORAL
Qty: 90 TABLET | Refills: 0 | Status: SHIPPED | OUTPATIENT
Start: 2022-08-31

## 2022-09-13 DIAGNOSIS — Z76.89 ENCOUNTER FOR WEIGHT MANAGEMENT: ICD-10-CM

## 2022-09-13 RX ORDER — OMEGA-3-ACID ETHYL ESTERS 1 G/1
CAPSULE, LIQUID FILLED ORAL
Qty: 180 CAPSULE | Refills: 0 | Status: SHIPPED | OUTPATIENT
Start: 2022-09-13

## 2022-09-25 DIAGNOSIS — I10 ESSENTIAL HYPERTENSION: Chronic | ICD-10-CM

## 2022-09-26 RX ORDER — TRIAMTERENE AND HYDROCHLOROTHIAZIDE 37.5; 25 MG/1; MG/1
1 CAPSULE ORAL EVERY MORNING
Qty: 90 CAPSULE | Refills: 0 | Status: SHIPPED | OUTPATIENT
Start: 2022-09-26

## 2022-10-16 DIAGNOSIS — Z76.89 ENCOUNTER FOR WEIGHT MANAGEMENT: ICD-10-CM

## 2022-10-17 RX ORDER — OMEGA-3-ACID ETHYL ESTERS 1 G/1
CAPSULE, LIQUID FILLED ORAL
Qty: 180 CAPSULE | Refills: 0 | Status: SHIPPED | OUTPATIENT
Start: 2022-10-17

## 2022-12-27 DIAGNOSIS — Z76.89 ENCOUNTER FOR WEIGHT MANAGEMENT: ICD-10-CM

## 2022-12-27 RX ORDER — OMEGA-3-ACID ETHYL ESTERS 1 G/1
CAPSULE, LIQUID FILLED ORAL
Qty: 180 CAPSULE | Refills: 0 | Status: SHIPPED | OUTPATIENT
Start: 2022-12-27

## 2022-12-28 ENCOUNTER — TELEPHONE (OUTPATIENT)
Dept: FAMILY MEDICINE CLINIC | Facility: CLINIC | Age: 32
End: 2022-12-28

## 2022-12-28 DIAGNOSIS — I10 ESSENTIAL HYPERTENSION: Chronic | ICD-10-CM

## 2022-12-28 RX ORDER — TRIAMTERENE AND HYDROCHLOROTHIAZIDE 37.5; 25 MG/1; MG/1
1 CAPSULE ORAL EVERY MORNING
Qty: 90 CAPSULE | Refills: 0 | Status: SHIPPED | OUTPATIENT
Start: 2022-12-28

## 2023-02-17 NOTE — TELEPHONE ENCOUNTER
/54 (BP Location: Right arm)   Pulse 77   Temp (!) 96.3  F (35.7  C) (Oral)   Resp 18   Wt 119.5 kg (263 lb 8 oz)   SpO2 97%   BMI 37.81 kg/m    Iso:  No active isolations  Diet: Regular Diet Adult  Snacks/Supplements Adult: Other; Please allow pt/RN to order snacks/supplements PRN; Between Meals  Snacks/Supplements Adult: Ensure Max Protein (bariatric); Between Meals  Mental Status:   Alert and oriented X 4  Main Acuity Score: 114.25  O2:     Mg+:    K:  4.1 (02/15 0830)  PLT: 303 (02/15 0830)  HGB: 10.3 (02/15 0830)  BS: 117 (02/16 1901)  No components found for: TROPL   Infusions: - MEDICATION INSTRUCTIONS -  - MEDICATION INSTRUCTIONS -  Patient does not have new respiratory symptoms.  Patient does not have new sore throat.  Patient does not have a fever greater than 99.5.     Pt is alert and oriented x 4. Denies having chest pain ,SOB,chills and headache . Pt repositioned as needed. Continue with POC.    Patient's Stated Goal for Shift:  No goal    Goal Status: Not met.       Pt informed .  She will make gio on line

## 2023-03-04 DIAGNOSIS — Z76.89 ENCOUNTER FOR WEIGHT MANAGEMENT: ICD-10-CM

## 2023-03-06 RX ORDER — OMEGA-3-ACID ETHYL ESTERS 1 G/1
CAPSULE, LIQUID FILLED ORAL
Qty: 180 CAPSULE | Refills: 0 | Status: SHIPPED | OUTPATIENT
Start: 2023-03-06

## 2023-03-09 DIAGNOSIS — E66.01 MORBID OBESITY DUE TO EXCESS CALORIES (HCC): ICD-10-CM

## 2023-03-09 DIAGNOSIS — Z79.899 LONG-TERM USE OF HIGH-RISK MEDICATION: ICD-10-CM

## 2023-03-09 DIAGNOSIS — Z76.89 ENCOUNTER FOR WEIGHT MANAGEMENT: ICD-10-CM

## 2023-03-20 RX ORDER — PHENTERMINE HYDROCHLORIDE 37.5 MG/1
37.5 TABLET ORAL
Qty: 90 TABLET | Refills: 0 | Status: CANCELLED | OUTPATIENT
Start: 2023-03-20

## 2023-03-21 NOTE — TELEPHONE ENCOUNTER
Pt has not established care with a new provider since Dr. Urbano Adhikari left. My chart message sent to verify if patient has a new provider, if not she needs to establish care in office with new provider.

## 2023-05-01 DIAGNOSIS — I10 ESSENTIAL HYPERTENSION: Chronic | ICD-10-CM

## 2023-05-01 NOTE — TELEPHONE ENCOUNTER
A refill request was received for:  Requested Prescriptions     Pending Prescriptions Disp Refills    TRIAMTERENE-HYDROCHLOROTHIAZIDE 37.5-25 MG Oral Cap [Pharmacy Med Name: TRIAMTERENE 37.5MG/ HCTZ 25MG CAPS] 90 capsule 0     Sig: Take 1 capsule by mouth every morning. Last refill date:  04/01/2023  Qty: 27  Last office visit: 08/05/2022 with Dr. Fabian  When is follow up due: 02/05/2023     For hormone prescriptions, date of last blood test for rx being requested:    No future appointments.

## 2023-05-02 RX ORDER — TRIAMTERENE AND HYDROCHLOROTHIAZIDE 37.5; 25 MG/1; MG/1
1 CAPSULE ORAL EVERY MORNING
Qty: 30 CAPSULE | Refills: 0 | Status: SHIPPED | OUTPATIENT
Start: 2023-05-02

## 2023-09-27 DIAGNOSIS — Z76.89 ENCOUNTER FOR WEIGHT MANAGEMENT: ICD-10-CM

## 2023-11-05 RX ORDER — OMEGA-3-ACID ETHYL ESTERS 1 G/1
1 CAPSULE, LIQUID FILLED ORAL 2 TIMES DAILY
Qty: 180 CAPSULE | Refills: 0 | OUTPATIENT
Start: 2023-11-05

## 2023-11-09 DIAGNOSIS — Z76.89 ENCOUNTER FOR WEIGHT MANAGEMENT: ICD-10-CM

## 2023-11-16 RX ORDER — OMEGA-3-ACID ETHYL ESTERS 1 G/1
1 CAPSULE, LIQUID FILLED ORAL 2 TIMES DAILY
Qty: 180 CAPSULE | Refills: 0 | OUTPATIENT
Start: 2023-11-16

## (undated) DIAGNOSIS — Z79.899 LONG-TERM USE OF HIGH-RISK MEDICATION: ICD-10-CM

## (undated) DIAGNOSIS — Z76.89 ENCOUNTER FOR WEIGHT MANAGEMENT: ICD-10-CM

## (undated) DIAGNOSIS — F41.1 GENERALIZED ANXIETY DISORDER: ICD-10-CM

## (undated) DIAGNOSIS — E66.01 MORBID OBESITY DUE TO EXCESS CALORIES (HCC): ICD-10-CM

## (undated) DIAGNOSIS — I10 ESSENTIAL HYPERTENSION: Chronic | ICD-10-CM

## (undated) NOTE — MR AVS SNAPSHOT
[address] at [address], [address]  [address] [place] [address] Ave  [phone]               Thank you for choosing us for your health care visit with Volodymyr Zamora MD.  We are glad to serve you and happy to pr Take 1 capsule by mouth every morning.    Commonly known as:  DYAZIDE                Where to Get Your Medications      These medications were sent to [address], [address] [place] [address], [phone], 7 Eat plenty of protein, keep the fat content low Sugars:  sodas and sports drinks, candies and desserts   Eat plenty of low-fat dairy products High fat meats and dairy   Choose whole grain products Foods high in sodium   Water is best for hydration Fast geoff

## (undated) NOTE — LETTER
3/16/2022              Tina Jimenez        [id number] [address] [facility]        [place] [address]         To Whom It May Concern,    I am writing to you to appeal your denial of phentermine for my patient, Tina Jimenez,  2/15/1990, above. Latonya Cabrera has morbid obesity and has been struggling for over one year to lose and maintain weight. She has adapted healthy life style modifications, exercise and a lower calorie diet without significant improvement in her weight. Her last BMI in my office was 46.95. I am requesting that you approve coverage for Phentermine 37.5 mg daily, which I recently prescribed for appetite suppression to help her maintain a low calorie diet and promote weight loss. As you are aware, obesity is a very serious disease and a significant risk factor for diabetes and  hypertension (the later, for which I am already treating her). Reducing Jessica's weight will improve or lead to the resolution of her hypertension, another risk factor for cardiovascular disease and stroke. Please reconsider your denial of coverage of phentermine 37.5 mg once daily. Thank you for your consideration in this very important medical matter.     Sincerely,          Devante Ruggiero MD  [address] [address] [phone]        Document electronically generated by:  Devante Ruggiero MD

## (undated) NOTE — LETTER
08/02/19        Tejas Cain  [address] [address] [facility] [place] [address]      Dear Jeff Kang,    [address] [facility] records indicate that you have outstanding lab work and or testing that was ordered for you and has not yet been completed:  Orders Placed This Encounter

## (undated) NOTE — ED AVS SNAPSHOT
Surinder Galvan   MRN: [id number]    Department:  [facility]   Date of Visit:  4/27/2018           Disclosure     Insurance plans vary and the physician(s) referred by the ER may not be covered by your plan.  Please contact CARE PHYSICIAN AT ONCE OR RETURN IMMEDIATELY TO THE EMERGENCY DEPARTMENT. If you have been prescribed any medication(s), please fill your prescription right away and begin taking the medication(s) as directed.   If you believe that any of the medications

## (undated) NOTE — LETTER
08/02/19        Melania Busby  [id number] [address] [place] [address]      Dear Rip Sanderson,    15[address] [facility] records indicate that you have outstanding lab work and or testing that was ordered for you and has not yet been completed:  Orders Placed This Encounter

## (undated) NOTE — LETTER
01/07/21        Kristi Smith  [address] [place] [address]      Dear Kurt Hammer,    [address] [facility] records indicate that you have outstanding lab work and or testing that was ordered for you and has not yet been completed:  Orders Placed This Encounter

## (undated) NOTE — LETTER
3/16/2022              Haydee Rubio   CASE ID# [id number]        [id number] [address] [place] [address] [place] [address]         To Whom It May Concern,    I am writing to you to appeal your denial of phentermine for my patient, Haydee Rubio,  2/15/1990, above. Chantal Carr has morbid obesity and has been struggling for over one year to lose and maintain weight. She has adapted healthy life style modifications, exercise and a lower calorie diet without significant improvement in her weight. Her last BMI in my office was 46.95. I am requesting that you approve coverage for Phentermine 37.5 mg daily, which I recently prescribed for appetite suppression to help her maintain a low calorie diet and promote weight loss. As you are aware, obesity is a very serious disease and a significant risk factor for diabetes and  hypertension (the later, for which I am already treating her). Reducing Jessica's weight will improve or lead to the resolution of her hypertension, another risk factor for cardiovascular disease and stroke. Please reconsider your denial of coverage of phentermine 37.5 mg once daily. Thank you for your consideration in this very important medical matter.     Sincerely,          Morgan Worrell MD  [address] , [facility] HARSH Cavazos  [address] [id number] [phone]    Document electronically generated by:  Morgan Worrell MD       [address] [facility] [phone]

## (undated) NOTE — LETTER
10/10/20        Everet Backers  [address] [place] [place] [address]      Dear Cheryle Clan,    15[address] [facility] records indicate that you have outstanding lab work and or testing that was ordered for you and has not yet been completed:  Orders Placed This Encounter